# Patient Record
Sex: FEMALE | Race: WHITE | NOT HISPANIC OR LATINO | Employment: FULL TIME | URBAN - METROPOLITAN AREA
[De-identification: names, ages, dates, MRNs, and addresses within clinical notes are randomized per-mention and may not be internally consistent; named-entity substitution may affect disease eponyms.]

---

## 2017-02-20 ENCOUNTER — CONVERSION ENCOUNTER (OUTPATIENT)
Dept: GENERAL RADIOLOGY | Facility: HOSPITAL | Age: 52
End: 2017-02-20

## 2019-02-15 ENCOUNTER — HOSPITAL ENCOUNTER (OUTPATIENT)
Dept: MAMMOGRAPHY | Facility: HOSPITAL | Age: 54
Discharge: HOME | End: 2019-02-15
Attending: OBSTETRICS & GYNECOLOGY

## 2019-08-07 ENCOUNTER — HOSPITAL ENCOUNTER (OUTPATIENT)
Dept: GENERAL RADIOLOGY | Facility: HOSPITAL | Age: 54
Discharge: HOME | End: 2019-08-07
Attending: OBSTETRICS & GYNECOLOGY

## 2020-10-20 ENCOUNTER — OFFICE VISIT CONVERTED (OUTPATIENT)
Dept: FAMILY MEDICINE CLINIC | Age: 55
End: 2020-10-20
Attending: NURSE PRACTITIONER

## 2020-10-20 ENCOUNTER — HOSPITAL ENCOUNTER (OUTPATIENT)
Dept: OTHER | Facility: HOSPITAL | Age: 55
Discharge: HOME | End: 2020-10-20
Attending: NURSE PRACTITIONER

## 2020-10-20 LAB
25(OH)D3 SERPL-MCNC: 18 NG/ML (ref 30–100)
ALBUMIN SERPL-MCNC: 5 G/DL (ref 3.5–5)
ALBUMIN/GLOB SERPL: 2 {RATIO} (ref 1.4–2.6)
ALP SERPL-CCNC: 85 U/L (ref 53–141)
ALT SERPL-CCNC: 14 U/L (ref 10–40)
ANION GAP SERPL CALC-SCNC: 16 MMOL/L (ref 8–19)
AST SERPL-CCNC: 18 U/L (ref 15–50)
BASOPHILS # BLD AUTO: 0.05 10*3/UL (ref 0–0.2)
BASOPHILS NFR BLD AUTO: 0.9 % (ref 0–3)
BILIRUB SERPL-MCNC: 0.82 MG/DL (ref 0.2–1.3)
BUN SERPL-MCNC: 13 MG/DL (ref 5–25)
BUN/CREAT SERPL: 20 {RATIO} (ref 6–20)
CALCIUM SERPL-MCNC: 9.5 MG/DL (ref 8.7–10.4)
CHLORIDE SERPL-SCNC: 101 MMOL/L (ref 99–111)
CONV ABS IMM GRAN: 0.01 10*3/UL (ref 0–0.2)
CONV CO2: 26 MMOL/L (ref 22–32)
CONV IMMATURE GRAN: 0.2 % (ref 0–1.8)
CONV TOTAL PROTEIN: 7.5 G/DL (ref 6.3–8.2)
CREAT UR-MCNC: 0.66 MG/DL (ref 0.5–0.9)
DEPRECATED RDW RBC AUTO: 39.8 FL (ref 36.4–46.3)
EOSINOPHIL # BLD AUTO: 0.2 10*3/UL (ref 0–0.7)
EOSINOPHIL # BLD AUTO: 3.6 % (ref 0–7)
ERYTHROCYTE [DISTWIDTH] IN BLOOD BY AUTOMATED COUNT: 11.7 % (ref 11.7–14.4)
GFR SERPLBLD BASED ON 1.73 SQ M-ARVRAT: >60 ML/MIN/{1.73_M2}
GLOBULIN UR ELPH-MCNC: 2.5 G/DL (ref 2–3.5)
GLUCOSE SERPL-MCNC: 80 MG/DL (ref 65–99)
HCT VFR BLD AUTO: 41.3 % (ref 37–47)
HGB BLD-MCNC: 14 G/DL (ref 12–16)
LYMPHOCYTES # BLD AUTO: 1.65 10*3/UL (ref 1–5)
LYMPHOCYTES NFR BLD AUTO: 29.7 % (ref 20–45)
MCH RBC QN AUTO: 31.4 PG (ref 27–31)
MCHC RBC AUTO-ENTMCNC: 33.9 G/DL (ref 33–37)
MCV RBC AUTO: 92.6 FL (ref 81–99)
MONOCYTES # BLD AUTO: 0.46 10*3/UL (ref 0.2–1.2)
MONOCYTES NFR BLD AUTO: 8.3 % (ref 3–10)
NEUTROPHILS # BLD AUTO: 3.18 10*3/UL (ref 2–8)
NEUTROPHILS NFR BLD AUTO: 57.3 % (ref 30–85)
NRBC CBCN: 0 % (ref 0–0.7)
OSMOLALITY SERPL CALC.SUM OF ELEC: 287 MOSM/KG (ref 273–304)
PLATELET # BLD AUTO: 226 10*3/UL (ref 130–400)
PMV BLD AUTO: 10.5 FL (ref 9.4–12.3)
POTASSIUM SERPL-SCNC: 3.6 MMOL/L (ref 3.5–5.3)
RBC # BLD AUTO: 4.46 10*6/UL (ref 4.2–5.4)
SODIUM SERPL-SCNC: 139 MMOL/L (ref 135–147)
TSH SERPL-ACNC: 3.59 M[IU]/L (ref 0.27–4.2)
WBC # BLD AUTO: 5.55 10*3/UL (ref 4.8–10.8)

## 2020-10-21 LAB — VIT B12 SERPL-MCNC: 318 PG/ML (ref 211–911)

## 2021-01-23 ENCOUNTER — HOSPITAL ENCOUNTER (OUTPATIENT)
Dept: OTHER | Facility: HOSPITAL | Age: 56
Discharge: HOME | End: 2021-01-23
Attending: NURSE PRACTITIONER

## 2021-01-23 LAB — 25(OH)D3 SERPL-MCNC: 32.8 NG/ML (ref 30–100)

## 2021-04-08 ENCOUNTER — HOSPITAL ENCOUNTER (OUTPATIENT)
Dept: GENERAL RADIOLOGY | Facility: HOSPITAL | Age: 56
Discharge: HOME | End: 2021-04-08
Attending: OBSTETRICS & GYNECOLOGY

## 2021-05-05 ENCOUNTER — CONVERSION ENCOUNTER (OUTPATIENT)
Dept: GASTROENTEROLOGY | Facility: CLINIC | Age: 56
End: 2021-05-05
Attending: INTERNAL MEDICINE

## 2021-06-05 NOTE — H&P
History and Physical      Patient Name: Geraldine Candelario   Patient ID: [id number]   Sex: Female   YOB: 1965    Primary Care Provider: Virgilio Stovall MD   Referring Provider: Fredrick Hallman II MD    Visit Date: May 5, 2021    Provider: Xander Hallman MD   [facility]   Location Address: [address] [place] [address]  [id number]   Location Phone: [phone]          Chief Complaint  · Surgical History and Physical  · Screening Colonoscopy      History Of Present Illness  NON-INPATIENT HISTORY AND PHYSICAL  Allergies: NO KNOWN DRUG ALLERGIES   Chief Complaint/History of Present Illness: Colon Screening, His of colon Polyps   Colon Recall: Yes How Long: 3 years   Failed Outpatient Treatment/Contraindications: N/A   Current Medications: Suprep Bowel Prep Kit 17.5-3.13-1.6 gram oral recon soln   Significant Past Medical History: Rectal bleeding   Significant Family Medical History: Family history of colon cancer: Mother/58   Significant Past Surgical History: Colonoscopy and EGD (Endoscopy)   Previous Colonoscopy: Yes YEAR: 2017 By Whom: Xander Hallman MD   Previous EGD: Yes YEAR: 2010 By Whom: Xander Hallman MD   PHYSICAL EXAM:  Heart: Regular Rate and Rhythm   Lungs: Breathing Unlabored           Assessment  · Preoperative examination     V72.84/Z01.818  · Screening for colon cancer     V76.51/Z12.11  · History of colon polyps     V12.72/Z86.010  · Family history of colon cancer     V16.0/Z80.0      Plan  · Orders  o Consent for Colonoscopy with Possible Biopsy - Possible risks/complications, benefits, and alternatives to surgical or invasive procedure have been explained to patient and/or legal guardian. -Patient has been evaluated and can tolerate anesthesia and/or sedation. Risks, benefits, and alternatives to anesthesia and sedation have been explained to patient and/or legal guardian. (21299) - V72.84/Z01.818, V76.51/Z12.11, V12.72/Z86.010, V16.0/Z80.0 -  07/13/2021  · Medications  o Medications have been Reconciled  o Transition of Care or Provider Policy  · Instructions  o ****Surgical Orders****  o ***************  o Outpatient  o ***************  o RISK AND BENEFITS:  o Possible risks/complications, benefits and alternatives to surgical or invasive procedure have been explained to the patient and/or legal guardian.  o Patient has been evaluated and can tolerate anesthesia and/or sedation. Risks, benefits, and alternatives to anesthesia and sedation have been explained to the patient and/or legal guardian.  o ***************  o PREP: Per protocol  o IV: Per Anesthesia  o The above History and Physical Examination has been completed within 30 days of admission.  o This note has been transcribed by YEVGENIY Solorio. I have read and agree with the findings in this note.  o Electronically Identified Patient Education Materials Provided Electronically            Electronically Signed by: Prisca Maher MA -Author on May 5, 2021 09:43:58 AM

## 2021-07-02 VITALS
HEART RATE: 77 BPM | TEMPERATURE: 96.5 F | DIASTOLIC BLOOD PRESSURE: 61 MMHG | BODY MASS INDEX: 20.13 KG/M2 | WEIGHT: 132.8 LBS | HEIGHT: 68 IN | SYSTOLIC BLOOD PRESSURE: 108 MMHG

## 2021-10-05 RX ORDER — FLUTICASONE PROPIONATE 50 MCG
2 SPRAY, SUSPENSION (ML) NASAL DAILY
COMMUNITY

## 2021-10-05 RX ORDER — CETIRIZINE HYDROCHLORIDE 10 MG/1
10 TABLET ORAL DAILY
COMMUNITY

## 2021-10-05 NOTE — PRE-PROCEDURE INSTRUCTIONS
Pt states she has had several colonoscopies due to mother had colon cancer. Verbalized understanding of clear liq diet and po prep, note last bm should be clear. Arrival time is 0700 she can take flonase, and zyrtec with a sip of water in the am

## 2021-10-11 ENCOUNTER — HOSPITAL ENCOUNTER (OUTPATIENT)
Facility: HOSPITAL | Age: 56
Setting detail: HOSPITAL OUTPATIENT SURGERY
Discharge: HOME | End: 2021-10-11
Attending: INTERNAL MEDICINE | Admitting: INTERNAL MEDICINE

## 2021-10-11 ENCOUNTER — ANESTHESIA (OUTPATIENT)
Dept: GASTROENTEROLOGY | Facility: HOSPITAL | Age: 56
End: 2021-10-11

## 2021-10-11 ENCOUNTER — ANESTHESIA EVENT (OUTPATIENT)
Dept: GASTROENTEROLOGY | Facility: HOSPITAL | Age: 56
End: 2021-10-11

## 2021-10-11 VITALS
SYSTOLIC BLOOD PRESSURE: 105 MMHG | HEART RATE: 62 BPM | DIASTOLIC BLOOD PRESSURE: 73 MMHG | TEMPERATURE: 97 F | HEIGHT: 67 IN | RESPIRATION RATE: 16 BRPM | BODY MASS INDEX: 20.42 KG/M2 | OXYGEN SATURATION: 100 % | WEIGHT: 130.07 LBS

## 2021-10-11 DIAGNOSIS — Z86.010 HISTORY OF COLON POLYPS: ICD-10-CM

## 2021-10-11 PROCEDURE — 45385 COLONOSCOPY W/LESION REMOVAL: CPT | Performed by: INTERNAL MEDICINE

## 2021-10-11 PROCEDURE — 88305 TISSUE EXAM BY PATHOLOGIST: CPT | Performed by: INTERNAL MEDICINE

## 2021-10-11 PROCEDURE — C1889 IMPLANT/INSERT DEVICE, NOC: HCPCS | Performed by: INTERNAL MEDICINE

## 2021-10-11 PROCEDURE — 25010000002 PROPOFOL 10 MG/ML EMULSION: Performed by: NURSE ANESTHETIST, CERTIFIED REGISTERED

## 2021-10-11 PROCEDURE — 45381 COLONOSCOPY SUBMUCOUS NJX: CPT | Performed by: INTERNAL MEDICINE

## 2021-10-11 PROCEDURE — 25010000002 GLUCAGON (HUMAN RECOMBINANT) 1 MG RECONSTITUTED SOLUTION: Performed by: NURSE ANESTHETIST, CERTIFIED REGISTERED

## 2021-10-11 DEVICE — DEV CLIP HEMO RESOLUTION360/ULTR 235CM 17MM STRL: Type: IMPLANTABLE DEVICE | Site: COLON | Status: FUNCTIONAL

## 2021-10-11 DEVICE — DEV CLIP ENDO RESOLUTION360 CONTRL ROT 235CM: Type: IMPLANTABLE DEVICE | Site: COLON | Status: FUNCTIONAL

## 2021-10-11 RX ORDER — LIDOCAINE HYDROCHLORIDE 20 MG/ML
INJECTION, SOLUTION INFILTRATION; PERINEURAL AS NEEDED
Status: DISCONTINUED | OUTPATIENT
Start: 2021-10-11 | End: 2021-10-11 | Stop reason: SURG

## 2021-10-11 RX ORDER — EPHEDRINE SULFATE 50 MG/ML
INJECTION, SOLUTION INTRAVENOUS AS NEEDED
Status: DISCONTINUED | OUTPATIENT
Start: 2021-10-11 | End: 2021-10-11 | Stop reason: SURG

## 2021-10-11 RX ORDER — SODIUM CHLORIDE, SODIUM LACTATE, POTASSIUM CHLORIDE, CALCIUM CHLORIDE 600; 310; 30; 20 MG/100ML; MG/100ML; MG/100ML; MG/100ML
30 INJECTION, SOLUTION INTRAVENOUS CONTINUOUS
Status: DISCONTINUED | OUTPATIENT
Start: 2021-10-11 | End: 2021-10-11 | Stop reason: HOSPADM

## 2021-10-11 RX ORDER — PHENYLEPHRINE HCL IN 0.9% NACL 1 MG/10 ML
SYRINGE (ML) INTRAVENOUS AS NEEDED
Status: DISCONTINUED | OUTPATIENT
Start: 2021-10-11 | End: 2021-10-11 | Stop reason: SURG

## 2021-10-11 RX ADMIN — Medication 50 MCG: at 09:30

## 2021-10-11 RX ADMIN — SODIUM CHLORIDE, POTASSIUM CHLORIDE, SODIUM LACTATE AND CALCIUM CHLORIDE 30 ML/HR: 600; 310; 30; 20 INJECTION, SOLUTION INTRAVENOUS at 07:53

## 2021-10-11 RX ADMIN — Medication 100 MCG: at 09:21

## 2021-10-11 RX ADMIN — Medication 50 MCG: at 09:36

## 2021-10-11 RX ADMIN — GLUCAGON HYDROCHLORIDE 0.5 MG: KIT at 09:10

## 2021-10-11 RX ADMIN — GLUCAGON HYDROCHLORIDE 0.5 MG: KIT at 09:14

## 2021-10-11 RX ADMIN — EPHEDRINE SULFATE 5 MG: 50 INJECTION INTRAVENOUS at 09:36

## 2021-10-11 RX ADMIN — PROPOFOL 250 MCG/KG/MIN: 10 INJECTION, EMULSION INTRAVENOUS at 08:45

## 2021-10-11 RX ADMIN — LIDOCAINE HYDROCHLORIDE 40 MG: 20 INJECTION, SOLUTION INFILTRATION; PERINEURAL at 08:45

## 2021-10-11 NOTE — ANESTHESIA PREPROCEDURE EVALUATION
Anesthesia Evaluation     Patient summary reviewed and Nursing notes reviewed   no history of anesthetic complications:  NPO Solid Status: > 8 hours  NPO Liquid Status: > 2 hours           Airway   Mallampati: II  TM distance: >3 FB  Neck ROM: full  No difficulty expected  Dental      Pulmonary - negative pulmonary ROS and normal exam    breath sounds clear to auscultation  Cardiovascular - negative cardio ROS and normal exam  Exercise tolerance: excellent (>7 METS)    Rhythm: regular        Neuro/Psych- negative ROS  GI/Hepatic/Renal/Endo    (+)  GERD, GI bleeding lower ,     Musculoskeletal     Abdominal    Substance History      OB/GYN          Other                        Anesthesia Plan    ASA 2     general   total IV anesthesia    Anesthetic plan, all risks, benefits, and alternatives have been provided, discussed and informed consent has been obtained with: patient.

## 2021-10-11 NOTE — ANESTHESIA POSTPROCEDURE EVALUATION
Patient: Geraldine Candelario    Procedure Summary     Date: 10/11/21 Room / Location: [facility] ENDOSCOPY 3 / [facility] ENDOSCOPY    Anesthesia Start: 0844 Anesthesia Stop: 0948    Procedure: COLONOSCOPY WITH ORISE INJECTION, POLYPECTOMY, CAUTERY, CLIP APPLICATION X6 (N/A ) Diagnosis: (history of colon polyps)    Surgeons: Xander Hallman MD Provider: Ryna Martinez MD    Anesthesia Type: general ASA Status: 2          Anesthesia Type: general    Vitals  Vitals Value Taken Time   BP     Temp     Pulse 59 10/11/21 1000   Resp     SpO2 100 % 10/11/21 1000   Vitals shown include unvalidated device data.        Post Anesthesia Care and Evaluation    Patient location during evaluation: bedside  Patient participation: complete - patient participated  Level of consciousness: awake  Pain management: adequate  Airway patency: patent  Anesthetic complications: No anesthetic complications  PONV Status: none  Cardiovascular status: acceptable and stable  Respiratory status: acceptable  Hydration status: acceptable    Comments: An Anesthesiologist personally participated in the most demanding procedures (including induction and emergence if applicable) in the anesthesia plan, monitored the course of anesthesia administration at frequent intervals and remained physically present and available for immediate diagnosis and treatment of emergencies.

## 2021-10-11 NOTE — H&P
"ScreeningPre Procedure History & Physical    Chief Complaint:   Screening     Subjective     HPI:   Screening     Past Medical History:   Past Medical History:   Diagnosis Date   • GERD (gastroesophageal reflux disease)    • Rectal bleeding        Past Surgical History:  Past Surgical History:   Procedure Laterality Date   • ABDOMINAL SURGERY     • COLONOSCOPY      2016 & 2017   • ENDOSCOPY      2010 & 2005   • TONSILLECTOMY     • TOOTH EXTRACTION     • TUBAL ABDOMINAL LIGATION         Family History:  Family History   Problem Relation Age of Onset   • Colon cancer Mother 58   • Cancer Mother        Social History:   reports that she has never smoked. She does not have any smokeless tobacco history on file. She reports current alcohol use. Drug use questions deferred to the physician.    Medications:   Medications Prior to Admission   Medication Sig Dispense Refill Last Dose   • cetirizine (ZyrTEC Allergy) 10 MG tablet Take 10 mg by mouth Daily.   10/10/2021 at Unknown time   • fluticasone (FLONASE) 50 MCG/ACT nasal spray 2 sprays into the nostril(s) as directed by provider Daily.   10/10/2021 at Unknown time       Allergies:  Latex        Objective     Blood pressure 106/69, pulse 73, temperature 98.5 °F (36.9 °C), temperature source Temporal, resp. rate 17, height 170.2 cm (67.01\"), weight 59 kg (130 lb 1.1 oz), SpO2 97 %.    Physical Exam   Constitutional: Pt is oriented to person, place, and time and well-developed, well-nourished, and in no distress.   Mouth/Throat: Oropharynx is clear and moist.   Neck: Normal range of motion.   Cardiovascular: Normal rate, regular rhythm and normal heart sounds.    Pulmonary/Chest: Effort normal and breath sounds normal.   Abdominal: Soft. Nontender  Skin: Skin is warm and dry.   Psychiatric: Mood, memory, affect and judgment normal.     Assessment/Plan     Diagnosis:  Screening colonoscopy  H/o colon polyps     Anticipated Surgical Procedure:  colonoscopy    The risks, " benefits, and alternatives of this procedure have been discussed with the patient or the responsible party- the patient understands and agrees to proceed.

## 2021-10-12 LAB
CYTO UR: NORMAL
LAB AP CASE REPORT: NORMAL
LAB AP CLINICAL INFORMATION: NORMAL
PATH REPORT.FINAL DX SPEC: NORMAL
PATH REPORT.GROSS SPEC: NORMAL

## 2021-10-15 ENCOUNTER — PREP FOR SURGERY (OUTPATIENT)
Dept: OTHER | Facility: HOSPITAL | Age: 56
End: 2021-10-15

## 2021-10-15 DIAGNOSIS — K63.5 POLYP OF COLON, UNSPECIFIED PART OF COLON, UNSPECIFIED TYPE: Primary | ICD-10-CM

## 2021-10-18 PROBLEM — K63.5 POLYP OF COLON: Status: ACTIVE | Noted: 2021-10-18

## 2022-01-03 ENCOUNTER — TELEPHONE (OUTPATIENT)
Dept: GASTROENTEROLOGY | Facility: CLINIC | Age: 57
End: 2022-01-03

## 2022-01-03 NOTE — TELEPHONE ENCOUNTER
"Ms Candelario called stating on 02/07/22 she is sched for a colonoscopy. Starting yesterday afternoon, having dysphagia and food lodge in her throat. She said its better now and about 10 yrs ago she had her \"throat stretched\" in [place]. Asking to add a EGD 02/07/2022..    Ok per NEHEMIAS Molina  "

## 2022-02-03 NOTE — PRE-PROCEDURE INSTRUCTIONS
PT INSTRUCTED WHERE TO COME TO AND CLEAR LIQ DIET AND PO SPLIT PREP SHE CAN TAKE  ZYRTEC  WITH A SIP OF WATER IN THE AM  ARRIVAL TIME IS 0600 AM ON Monday 2/7/22

## 2022-02-07 ENCOUNTER — ANESTHESIA EVENT (OUTPATIENT)
Dept: GASTROENTEROLOGY | Facility: HOSPITAL | Age: 57
End: 2022-02-07

## 2022-02-07 ENCOUNTER — HOSPITAL ENCOUNTER (OUTPATIENT)
Facility: HOSPITAL | Age: 57
Setting detail: HOSPITAL OUTPATIENT SURGERY
Discharge: HOME | End: 2022-02-07
Attending: INTERNAL MEDICINE | Admitting: INTERNAL MEDICINE

## 2022-02-07 ENCOUNTER — ANESTHESIA (OUTPATIENT)
Dept: GASTROENTEROLOGY | Facility: HOSPITAL | Age: 57
End: 2022-02-07

## 2022-02-07 VITALS
WEIGHT: 132.28 LBS | OXYGEN SATURATION: 99 % | HEART RATE: 61 BPM | SYSTOLIC BLOOD PRESSURE: 105 MMHG | DIASTOLIC BLOOD PRESSURE: 70 MMHG | TEMPERATURE: 96.8 F | BODY MASS INDEX: 20.76 KG/M2 | HEIGHT: 67 IN | RESPIRATION RATE: 16 BRPM

## 2022-02-07 DIAGNOSIS — D12.6 ADENOMATOUS POLYP OF COLON, UNSPECIFIED PART OF COLON: Primary | ICD-10-CM

## 2022-02-07 DIAGNOSIS — K63.5 POLYP OF COLON, UNSPECIFIED PART OF COLON, UNSPECIFIED TYPE: ICD-10-CM

## 2022-02-07 PROCEDURE — 43239 EGD BIOPSY SINGLE/MULTIPLE: CPT | Performed by: INTERNAL MEDICINE

## 2022-02-07 PROCEDURE — 43450 DILATE ESOPHAGUS 1/MULT PASS: CPT | Performed by: INTERNAL MEDICINE

## 2022-02-07 PROCEDURE — 88305 TISSUE EXAM BY PATHOLOGIST: CPT | Performed by: INTERNAL MEDICINE

## 2022-02-07 PROCEDURE — 25010000002 PROPOFOL 10 MG/ML EMULSION: Performed by: NURSE ANESTHETIST, CERTIFIED REGISTERED

## 2022-02-07 PROCEDURE — 45378 DIAGNOSTIC COLONOSCOPY: CPT | Performed by: INTERNAL MEDICINE

## 2022-02-07 RX ORDER — PROPOFOL 10 MG/ML
VIAL (ML) INTRAVENOUS AS NEEDED
Status: DISCONTINUED | OUTPATIENT
Start: 2022-02-07 | End: 2022-02-07 | Stop reason: SURG

## 2022-02-07 RX ORDER — GLYCOPYRROLATE 0.2 MG/ML
INJECTION INTRAMUSCULAR; INTRAVENOUS AS NEEDED
Status: DISCONTINUED | OUTPATIENT
Start: 2022-02-07 | End: 2022-02-07 | Stop reason: SURG

## 2022-02-07 RX ORDER — SODIUM CHLORIDE, SODIUM LACTATE, POTASSIUM CHLORIDE, CALCIUM CHLORIDE 600; 310; 30; 20 MG/100ML; MG/100ML; MG/100ML; MG/100ML
1000 INJECTION, SOLUTION INTRAVENOUS CONTINUOUS
Status: DISCONTINUED | OUTPATIENT
Start: 2022-02-07 | End: 2022-02-07 | Stop reason: HOSPADM

## 2022-02-07 RX ORDER — SODIUM CHLORIDE, SODIUM LACTATE, POTASSIUM CHLORIDE, CALCIUM CHLORIDE 600; 310; 30; 20 MG/100ML; MG/100ML; MG/100ML; MG/100ML
30 INJECTION, SOLUTION INTRAVENOUS CONTINUOUS
Status: DISCONTINUED | OUTPATIENT
Start: 2022-02-07 | End: 2022-02-07 | Stop reason: HOSPADM

## 2022-02-07 RX ORDER — LIDOCAINE HYDROCHLORIDE 20 MG/ML
INJECTION, SOLUTION INFILTRATION; PERINEURAL AS NEEDED
Status: DISCONTINUED | OUTPATIENT
Start: 2022-02-07 | End: 2022-02-07 | Stop reason: SURG

## 2022-02-07 RX ADMIN — PROPOFOL 70 MG: 10 INJECTION, EMULSION INTRAVENOUS at 07:39

## 2022-02-07 RX ADMIN — PROPOFOL 150 MCG/KG/MIN: 10 INJECTION, EMULSION INTRAVENOUS at 07:37

## 2022-02-07 RX ADMIN — GLYCOPYRROLATE 0.1 MG: 0.2 INJECTION INTRAMUSCULAR; INTRAVENOUS at 07:40

## 2022-02-07 RX ADMIN — LIDOCAINE HYDROCHLORIDE 60 MG: 20 INJECTION, SOLUTION INFILTRATION; PERINEURAL at 07:37

## 2022-02-07 RX ADMIN — PROPOFOL 30 MG: 10 INJECTION, EMULSION INTRAVENOUS at 07:49

## 2022-02-07 RX ADMIN — SODIUM CHLORIDE, POTASSIUM CHLORIDE, SODIUM LACTATE AND CALCIUM CHLORIDE 30 ML/HR: 600; 310; 30; 20 INJECTION, SOLUTION INTRAVENOUS at 06:46

## 2022-02-07 RX ADMIN — PROPOFOL 30 MG: 10 INJECTION, EMULSION INTRAVENOUS at 07:44

## 2022-02-07 NOTE — ANESTHESIA POSTPROCEDURE EVALUATION
Patient: Geraldine Candelario    Procedure Summary     Date: 02/07/22 Room / Location: [facility] ENDOSCOPY 2 / [facility] ENDOSCOPY    Anesthesia Start: 0736 Anesthesia Stop: 0805    Procedures:       ESOPHAGOGASTRODUODENOSCOPY WITH BIOPSIES, DILATION WITH 50FR CALL (N/A )      COLONOSCOPY (N/A ) Diagnosis:       Polyp of colon, unspecified part of colon, unspecified type      (Polyp of colon, unspecified part of colon, unspecified type [K63.5])      (DYSPHAGIA)    Surgeons: Xander Hallman MD Provider: Graham Condon MD    Anesthesia Type: general ASA Status: 1          Anesthesia Type: general    Vitals  Vitals Value Taken Time   /70 02/07/22 0825   Temp 36 °C (96.8 °F) 02/07/22 0825   Pulse 61 02/07/22 0825   Resp 16 02/07/22 0825   SpO2 99 % 02/07/22 0825           Post Anesthesia Care and Evaluation    Patient location during evaluation: bedside  Patient participation: complete - patient participated  Level of consciousness: awake  Pain management: adequate  Airway patency: patent  Anesthetic complications: No anesthetic complications  PONV Status: none  Cardiovascular status: acceptable  Respiratory status: acceptable  Hydration status: acceptable    Comments: An Anesthesiologist personally participated in the most demanding procedures (including induction and emergence if applicable) in the anesthesia plan, monitored the course of anesthesia administration at frequent intervals and remained physically present and available for immediate diagnosis and treatment of emergencies.

## 2022-02-07 NOTE — H&P
"ScreeningPre Procedure History & Physical    Chief Complaint:   Screening   dysphagia    Subjective     HPI:   Screening     Past Medical History:   Past Medical History:   Diagnosis Date   • Colon polyps    • Esophageal dilatation 2012   • Esophageal stricture 2012   • GERD (gastroesophageal reflux disease)    • Rectal bleeding        Past Surgical History:  Past Surgical History:   Procedure Laterality Date   • ABDOMINAL SURGERY     • COLONOSCOPY      2016 & 2017   • COLONOSCOPY N/A 10/11/2021    Procedure: COLONOSCOPY WITH ORISE INJECTION, POLYPECTOMY, CAUTERY, CLIP APPLICATION X6;  Surgeon: Xander Hallman MD;  Location: [facility] ENDOSCOPY;  Service: Gastroenterology;  Laterality: N/A;  DIVERTICULOSIS, COLON POLYP   • ENDOSCOPY      2010 & 2005   • TONSILLECTOMY     • TOOTH EXTRACTION     • TUBAL ABDOMINAL LIGATION         Family History:  Family History   Problem Relation Age of Onset   • Colon cancer Mother 58   • Cancer Mother    • Cancer Sister    • Colon cancer Sister 64   • Malig Hyperthermia Neg Hx        Social History:   reports that she has never smoked. She has never used smokeless tobacco. She reports current alcohol use. Drug use questions deferred to the physician.    Medications:   Medications Prior to Admission   Medication Sig Dispense Refill Last Dose   • cetirizine (ZyrTEC Allergy) 10 MG tablet Take 10 mg by mouth Daily.   Past Week at Unknown time   • fluticasone (FLONASE) 50 MCG/ACT nasal spray 2 sprays into the nostril(s) as directed by provider Daily.   Past Week at Unknown time       Allergies:  Latex        Objective     Blood pressure 120/67, pulse 66, temperature 97.3 °F (36.3 °C), temperature source Temporal, resp. rate 16, height 170.2 cm (67.01\"), weight 60 kg (132 lb 4.4 oz), SpO2 97 %.    Physical Exam   Constitutional: Pt is oriented to person, place, and time and well-developed, well-nourished, and in no distress.   Mouth/Throat: Oropharynx is clear and moist.   Neck: " Normal range of motion.   Cardiovascular: Normal rate, regular rhythm and normal heart sounds.    Pulmonary/Chest: Effort normal and breath sounds normal.   Abdominal: Soft. Nontender  Skin: Skin is warm and dry.   Psychiatric: Mood, memory, affect and judgment normal.     Assessment/Plan     Diagnosis:  Screening colonoscopy  dysphagia     Anticipated Surgical Procedure:  colonoscopy  egd    The risks, benefits, and alternatives of this procedure have been discussed with the patient or the responsible party- the patient understands and agrees to proceed.

## 2022-02-08 ENCOUNTER — TELEPHONE (OUTPATIENT)
Dept: GASTROENTEROLOGY | Facility: CLINIC | Age: 57
End: 2022-02-08

## 2022-02-08 NOTE — TELEPHONE ENCOUNTER
----- Message from Rima Waggoner NP sent at 2/8/2022  9:13 AM EST -----  Eosinophilic esophagitis.  Please inform the patient and educate about EOE.    Recall for colonoscopy in 3 years.

## 2022-02-14 NOTE — TELEPHONE ENCOUNTER
Spoke to pt and informed of Rima BIANCHI note. Educated pt on EOE and tracking with a food diary. Confirmed with pt f/u apt that has been est with Rima BIANCHI on 05/09/2022.  Pt verbalized understanding.

## 2022-05-04 NOTE — PROGRESS NOTES
Chief Complaint   EGD and colonoscopy follow up     History of Present Illness       Geraldine Candelario is a 56 y.o. female who presents to [facility] GASTROENTEROLOGY for follow-up after recent EGD and colonoscopy.  We have reviewed endoscopy and pathology at this time.  Patient has a history of dysphagia, eosinophilic esophagitis, colon polyps, family history of colon cancer and allergies.  It was noted on EGD that the patient had EOE.  Patient has not began the Prilosec.  She reports after esophageal dilation that she had complete relief of her dysphagia.  Patient reports she did have allergy testing and she continues on Zyrtec and Flonase for seasonal allergies.  Patient denies fever, nausea, vomiting, weight loss, night sweats, melena, hematochezia, hematemesis.    Endoscopy: Review of the patient's most recent EGD and colonoscopy performed by Dr. Hallman on 02/07/2022 mucosal changes including ringed esophagus and longitudinal furrows were found in the entire esophagus.  Eosinophilic esophagitis suspected.  1 benign-appearing intrinsic stenosis at the GE junction dilated with Madrigal 50 [place].  Mild erythema of the stomach.  Duodenum normal.  Medium size hiatal hernia.  Patient placed on Prilosec for 3 months.  Normal colonoscopy.  Repeat colonoscopy 3 years, 2025.  Pathology with mild inactive gastritis.  Midesophagus biopsy consistent with eosinophilic esophagitis.    Results       Result Review :                             Past Medical History       Past Medical History:   Diagnosis Date   • Colon polyps    • Esophageal dilatation 2012   • Esophageal stricture 2012   • GERD (gastroesophageal reflux disease)    • Rectal bleeding        Past Surgical History:   Procedure Laterality Date   • ABDOMINAL SURGERY     • COLONOSCOPY      2016 & 2017   • COLONOSCOPY N/A 10/11/2021    Procedure: COLONOSCOPY WITH ORISE INJECTION, POLYPECTOMY, CAUTERY, CLIP APPLICATION X6;  Surgeon: Xander Hallman  "MD Warren;  Location: [facility] ENDOSCOPY;  Service: Gastroenterology;  Laterality: N/A;  DIVERTICULOSIS, COLON POLYP   • COLONOSCOPY N/A 02/07/2022    Procedure: COLONOSCOPY;  Surgeon: Xander Hallman MD;  Location: [facility] ENDOSCOPY;  Service: Gastroenterology;  Laterality: N/A;  NORMAL COLONOSCOPY   • ENDOSCOPY      2010 & 2005   • ENDOSCOPY N/A 02/07/2022    Procedure: ESOPHAGOGASTRODUODENOSCOPY WITH BIOPSIES, DILATION WITH 50FR CALL;  Surgeon: Xander Hallman MD;  Location: [facility] ENDOSCOPY;  Service: Gastroenterology;  Laterality: N/A;  EOSINOPHYLLIC ESOPHAGITIS, ESOPHAGEAL STRICTURE, GASTRITIS   • TONSILLECTOMY     • TOOTH EXTRACTION     • TUBAL ABDOMINAL LIGATION     • UPPER GASTROINTESTINAL ENDOSCOPY           Current Outpatient Medications:   •  cetirizine (zyrTEC) 10 MG tablet, Take 10 mg by mouth Daily., Disp: , Rfl:   •  fluticasone (FLONASE) 50 MCG/ACT nasal spray, 2 sprays into the nostril(s) as directed by provider Daily., Disp: , Rfl:   •  omeprazole (priLOSEC) 40 MG capsule, Take 1 capsule by mouth Daily., Disp: 30 capsule, Rfl: 5     Allergies   Allergen Reactions   • Latex Unknown - Low Severity     ALLERGY TESTING ONLY NEVER HAS HAD A REACTION       Family History   Problem Relation Age of Onset   • Colon cancer Mother 58   • Cancer Mother    • Cancer Sister    • Colon cancer Sister 64   • Malig Hyperthermia Neg Hx         Social History     Social History Narrative   • Not on file       Objective     Vital Signs:   /60 (BP Location: Right arm, Patient Position: Sitting, Cuff Size: Adult)   Pulse 70   Ht 170.2 cm (67.01\")   Wt 62.3 kg (137 lb 6.4 oz)   SpO2 100%   BMI 21.51 kg/m²       Physical Exam  Constitutional:       General: She is not in acute distress.     Appearance: Normal appearance. She is well-developed and normal weight.   Eyes:      Conjunctiva/sclera: Conjunctivae normal.      Pupils: Pupils are equal, round, and reactive to light.      Visual Fields: " Right eye visual fields normal and left eye visual fields normal.   Cardiovascular:      Rate and Rhythm: Normal rate and regular rhythm.      Heart sounds: Normal heart sounds.   Pulmonary:      Effort: Pulmonary effort is normal. No retractions.      Breath sounds: Normal breath sounds and air entry.      Comments: Inspection of chest: normal appearance  Abdominal:      General: Bowel sounds are normal.      Palpations: Abdomen is soft.      Tenderness: There is no abdominal tenderness.      Comments: No appreciable hepatosplenomegaly   Musculoskeletal:      Cervical back: Neck supple.      Right lower leg: No edema.      Left lower leg: No edema.   Lymphadenopathy:      Cervical: No cervical adenopathy.   Skin:     Findings: No lesion.      Comments: Turgor normal   Neurological:      Mental Status: She is alert and oriented to person, place, and time.   Psychiatric:         Mood and Affect: Mood and affect normal.           Assessment & Plan          Assessment and Plan    Diagnoses and all orders for this visit:    1. Eosinophilic esophagitis (Primary)    2. Personal history of colonic polyps    3. Oropharyngeal dysphagia    Other orders  -     omeprazole (priLOSEC) 40 MG capsule; Take 1 capsule by mouth Daily.  Dispense: 30 capsule; Refill: 5      56-year-old female for follow-up after recent EGD and colonoscopy.  We have reviewed endoscopy and pathology at this time.  Patient has a history of dysphagia, eosinophilic esophagitis, colon polyps, family history of colon cancer and allergies.  I have began the patient on Prilosec 40 mg daily for history of EOE.  We will follow-up in the office in 1 year.  Patient to call if dysphagia reoccurs.  Patient be due for repeat colonoscopy in 2025.  Patient agreeable to this plan will call with any questions or concerns.          Follow Up       Follow Up   Return in about 1 year (around 5/9/2023).  Patient was given instructions and counseling regarding her condition or  for health maintenance advice. Please see specific information pulled into the AVS if appropriate.

## 2022-05-09 ENCOUNTER — OFFICE VISIT (OUTPATIENT)
Dept: GASTROENTEROLOGY | Facility: CLINIC | Age: 57
End: 2022-05-09

## 2022-05-09 VITALS
HEIGHT: 67 IN | OXYGEN SATURATION: 100 % | SYSTOLIC BLOOD PRESSURE: 101 MMHG | DIASTOLIC BLOOD PRESSURE: 60 MMHG | WEIGHT: 137.4 LBS | HEART RATE: 70 BPM | BODY MASS INDEX: 21.56 KG/M2

## 2022-05-09 DIAGNOSIS — Z86.010 PERSONAL HISTORY OF COLONIC POLYPS: ICD-10-CM

## 2022-05-09 DIAGNOSIS — K20.0 EOSINOPHILIC ESOPHAGITIS: Primary | ICD-10-CM

## 2022-05-09 DIAGNOSIS — R13.12 OROPHARYNGEAL DYSPHAGIA: ICD-10-CM

## 2022-05-09 PROCEDURE — 99214 OFFICE O/P EST MOD 30 MIN: CPT | Performed by: NURSE PRACTITIONER

## 2022-05-09 RX ORDER — OMEPRAZOLE 40 MG/1
40 CAPSULE, DELAYED RELEASE ORAL DAILY
Qty: 30 CAPSULE | Refills: 5 | Status: SHIPPED | OUTPATIENT
Start: 2022-05-09

## 2022-05-09 NOTE — PATIENT INSTRUCTIONS
Food Choices for Gastroesophageal Reflux Disease, Adult  When you have gastroesophageal reflux disease (GERD), the foods you eat and your eating habits are very important. Choosing the right foods can help ease your discomfort. Think about working with a food expert (dietitian) to help you make good choices.  What are tips for following this plan?  Reading food labels  Look for foods that are low in saturated fat. Foods that may help with your symptoms include:  Foods that have less than 5% of daily value (DV) of fat.  Foods that have 0 grams of trans fat.  Cooking  Do not avila your food.  Cook your food by baking, steaming, grilling, or broiling. These are all methods that do not need a lot of fat for cooking.  To add flavor, try to use herbs that are low in spice and acidity.  Meal planning    Choose healthy foods that are low in fat, such as:  Fruits and vegetables.  Whole grains.  Low-fat dairy products.  Lean meats, fish, and poultry.  Eat small meals often instead of eating 3 large meals each day. Eat your meals slowly in a place where you are relaxed. Avoid bending over or lying down until 2-3 hours after eating.  Limit high-fat foods such as fatty meats or fried foods.  Limit your intake of fatty foods, such as oils, butter, and shortening.  Avoid the following as told by your doctor:  Foods that cause symptoms. These may be different for different people. Keep a food diary to keep track of foods that cause symptoms.  Alcohol.  Drinking a lot of liquid with meals.  Eating meals during the 2-3 hours before bed.    Lifestyle  Stay at a healthy weight. Ask your doctor what weight is healthy for you. If you need to lose weight, work with your doctor to do so safely.  Exercise for at least 30 minutes on 5 or more days each week, or as told by your doctor.  Wear loose-fitting clothes.  Do not smoke or use any products that contain nicotine or tobacco. If you need help quitting, ask your doctor.  Sleep with the head  of your bed higher than your feet. Use a wedge under the mattress or blocks under the bed frame to raise the head of the bed.  Chew sugar-free gum after meals.  What foods should eat?    Eat a healthy, well-balanced diet of fruits, vegetables, whole grains, low-fat dairy products, lean meats, fish, and poultry. Each person is different.  Foods that may cause symptoms in one person may not cause any symptoms in another person. Work with your doctor to find foods that are safe for you.  The items listed above may not be a complete list of what you can eat and drink. Contact a food expert for more options.  What foods should I avoid?  Limiting some of these foods may help in managing the symptoms of GERD. Everyone is different. Talk with a food expert or your doctor to help you find the exact foods to avoid, if any.  Fruits  Any fruits prepared with added fat. Any fruits that cause symptoms. For some people, this may include citrus fruits, such as oranges, grapefruit, pineapple, and lesli.  Vegetables  Deep-fried vegetables. French fries. Any vegetables prepared with added fat. Any vegetables that cause symptoms. For some people, this may include tomatoes and tomato products, chili peppers, onions and garlic, and horseradish.  Grains  Pastries or quick breads with added fat.  Meats and other proteins  High-fat meats, such as fatty beef or pork, hot dogs, ribs, ham, sausage, salami, and martinez. Fried meat or protein, including fried fish and fried chicken. Nuts and nut butters, in large amounts.  Dairy  Whole milk and chocolate milk. Sour cream. Cream. Ice cream. Cream cheese. Milkshakes.  Fats and oils  Butter. Margarine. Shortening. Ghee.  Beverages  Coffee and tea, with or without caffeine. Carbonated beverages. Sodas. Energy drinks. Fruit juice made with acidic fruits, such as orange or grapefruit. Tomato juice. Alcoholic drinks.  Sweets and desserts  Chocolate and cocoa. Donuts.  Seasonings and condiments  Pepper.  Peppermint and spearmint. Added salt. Any condiments, herbs, or seasonings that cause symptoms. For some people, this may include jernigan, hot sauce, or vinegar-based salad dressings.  The items listed above may not be a complete list of what you should not eat and drink. Contact a food expert for more options.  Questions to ask your doctor  Diet and lifestyle changes are often the first steps that are taken to manage symptoms of GERD. If diet and lifestyle changes do not help, talk with your doctor about taking medicines.  Where to find more information  [employer] Gastrointestinal [employer]: aboutgerd.org  Summary  When you have GERD, food and lifestyle choices are very important in easing your symptoms.  Eat small meals often instead of 3 large meals a day. Eat your meals slowly and in a place where you are relaxed.  Avoid bending over or lying down until 2-3 hours after eating.  Limit high-fat foods such as fatty meats or fried foods.  This information is not intended to replace advice given to you by your health care provider. Make sure you discuss any questions you have with your health care provider.  Document Revised: 06/28/2021 Document Reviewed: 06/28/2021  Elsevier Patient Education © 2021 Elsevier Inc.

## 2022-06-09 ENCOUNTER — OFFICE VISIT (OUTPATIENT)
Dept: FAMILY MEDICINE CLINIC | Age: 57
End: 2022-06-09

## 2022-06-09 VITALS
HEIGHT: 67 IN | HEART RATE: 72 BPM | SYSTOLIC BLOOD PRESSURE: 98 MMHG | OXYGEN SATURATION: 99 % | WEIGHT: 136 LBS | DIASTOLIC BLOOD PRESSURE: 61 MMHG | BODY MASS INDEX: 21.35 KG/M2

## 2022-06-09 DIAGNOSIS — Z87.898 H/O MOTION SICKNESS: ICD-10-CM

## 2022-06-09 DIAGNOSIS — Z00.00 WELL ADULT EXAM: ICD-10-CM

## 2022-06-09 DIAGNOSIS — M81.0 OSTEOPOROSIS WITHOUT CURRENT PATHOLOGICAL FRACTURE, UNSPECIFIED OSTEOPOROSIS TYPE: Primary | ICD-10-CM

## 2022-06-09 DIAGNOSIS — G47.00 INSOMNIA, UNSPECIFIED TYPE: ICD-10-CM

## 2022-06-09 PROCEDURE — 99396 PREV VISIT EST AGE 40-64: CPT | Performed by: NURSE PRACTITIONER

## 2022-06-09 RX ORDER — HYDROXYZINE HYDROCHLORIDE 25 MG/1
25 TABLET, FILM COATED ORAL 3 TIMES DAILY PRN
Qty: 90 TABLET | Refills: 1 | Status: SHIPPED | OUTPATIENT
Start: 2022-06-09

## 2022-06-09 RX ORDER — PROMETHAZINE HYDROCHLORIDE 25 MG/1
25 TABLET ORAL EVERY 6 HOURS PRN
Qty: 20 TABLET | Refills: 1 | Status: SHIPPED | OUTPATIENT
Start: 2022-06-09

## 2022-06-09 NOTE — PROGRESS NOTES
"Chief Complaint  GERD (Follow up )    Subjective  menopause 2018  Recently had updated upper and lower scopes done.  She had esophageal stricture for second time and history of colon polyps.  She is up-to-date on tetanus vaccination.  Has not received shingles vaccination.  Insomnia is stable with use of hydroxyzine.  Denies side effects and requests refills.  Osteoporosis has been noted on previous bone density test.  She has declined treatment other than calcium and vitamin D supplementation.  Is going on a trip soon and would like to have some promethazine to take if she gets nauseated due to motion sickness.  She has taken promethazine in the past with good results.        Geraldine Candelario presents to [facility]          Objective   Vital Signs:   Vitals:    06/09/22 1454   BP: 98/61   BP Location: Left arm   Patient Position: Sitting   Cuff Size: Adult   Pulse: 72   SpO2: 99%   Weight: 61.7 kg (136 lb)   Height: 170.2 cm (67\")      Body mass index is 21.3 kg/m².  Physical Exam  Vitals reviewed.   Constitutional:       General: She is not in acute distress.     Appearance: Normal appearance. She is well-developed.   Neck:      Thyroid: No thyroid mass, thyromegaly or thyroid tenderness.   Cardiovascular:      Rate and Rhythm: Normal rate and regular rhythm.      Pulses:           Posterior tibial pulses are 2+ on the right side and 2+ on the left side.      Heart sounds: Normal heart sounds.   Pulmonary:      Effort: Pulmonary effort is normal.      Breath sounds: Normal breath sounds.   Musculoskeletal:      Right lower leg: No edema.      Left lower leg: No edema.   Skin:     General: Skin is warm and dry.   Neurological:      General: No focal deficit present.      Mental Status: She is alert.   Psychiatric:         Attention and Perception: Attention normal.         Mood and Affect: Mood and affect normal.         Behavior: Behavior normal.          Result Review :{Labs  " Result Review  Imaging  Med Tab  Media :23}                                    Assessment and Plan    Diagnoses and all orders for this visit:    1. Osteoporosis without current pathological fracture, unspecified osteoporosis type (Primary)  Assessment & Plan:  Treatment options discussed.  Weightbearing exercise, calcium and vitamin D supplementation, consider trial of Fosamax or Prolia.  1 disadvantages her chronic GI symptoms could make her more at risk for having GI upset with use of oral osteoporosis medications.  She will let me know if she wants to pursue any prescription treatment for osteoporosis.      2. Well adult exam  Assessment & Plan:  Preventive care measures are discussed.  Encouraged she check coverage for shingles vaccination with member benefits with her insurance.  For shingles vaccine if she would like.    Orders:  -     CBC w AUTO Differential; Future  -     Comprehensive metabolic panel; Future  -     Lipid panel; Future  -     TSH; Future    3. Insomnia, unspecified type  -     hydrOXYzine (ATARAX) 25 MG tablet; Take 1 tablet by mouth 3 (Three) Times a Day As Needed for Itching (insomnia).  Dispense: 90 tablet; Refill: 1    4. H/O motion sickness  -     promethazine (PHENERGAN) 25 MG tablet; Take 1 tablet by mouth Every 6 (Six) Hours As Needed for Nausea or Vomiting.  Dispense: 20 tablet; Refill: 1      Follow Up    Return in about 1 year (around 6/9/2023).  Patient was given instructions and counseling regarding her condition or for health maintenance advice. Please see specific information pulled into the AVS if appropriate.

## 2022-06-10 NOTE — ASSESSMENT & PLAN NOTE
Treatment options discussed.  Weightbearing exercise, calcium and vitamin D supplementation, consider trial of Fosamax or Prolia.  1 disadvantages her chronic GI symptoms could make her more at risk for having GI upset with use of oral osteoporosis medications.  She will let me know if she wants to pursue any prescription treatment for osteoporosis.

## 2022-06-10 NOTE — ASSESSMENT & PLAN NOTE
Preventive care measures are discussed.  Encouraged she check coverage for shingles vaccination with member benefits with her insurance.  For shingles vaccine if she would like.

## 2022-06-22 ENCOUNTER — TELEPHONE (OUTPATIENT)
Dept: FAMILY MEDICINE CLINIC | Age: 57
End: 2022-06-22

## 2022-06-22 NOTE — TELEPHONE ENCOUNTER
----- Message from Bebe Harvey LPN sent at 6/20/2022  7:59 AM EDT -----      ----- Message -----  From: SYSTEM  Sent: 6/19/2022   1:25 AM EDT  To: [facility] [place] [facility] [place]

## 2022-06-23 ENCOUNTER — LAB (OUTPATIENT)
Dept: LAB | Facility: HOSPITAL | Age: 57
End: 2022-06-23

## 2022-06-23 DIAGNOSIS — Z00.00 WELL ADULT EXAM: ICD-10-CM

## 2022-06-23 LAB
ALBUMIN SERPL-MCNC: 4.7 G/DL (ref 3.5–5.2)
ALBUMIN/GLOB SERPL: 2.4 G/DL
ALP SERPL-CCNC: 80 U/L (ref 39–117)
ALT SERPL W P-5'-P-CCNC: 48 U/L (ref 1–33)
ANION GAP SERPL CALCULATED.3IONS-SCNC: 9.8 MMOL/L (ref 5–15)
AST SERPL-CCNC: 33 U/L (ref 1–32)
BASOPHILS # BLD AUTO: 0.04 10*3/MM3 (ref 0–0.2)
BASOPHILS NFR BLD AUTO: 1 % (ref 0–1.5)
BILIRUB SERPL-MCNC: 0.7 MG/DL (ref 0–1.2)
BUN SERPL-MCNC: 15 MG/DL (ref 6–20)
BUN/CREAT SERPL: 24.6 (ref 7–25)
CALCIUM SPEC-SCNC: 9.6 MG/DL (ref 8.6–10.5)
CHLORIDE SERPL-SCNC: 104 MMOL/L (ref 98–107)
CHOLEST SERPL-MCNC: 265 MG/DL (ref 0–200)
CO2 SERPL-SCNC: 25.2 MMOL/L (ref 22–29)
CREAT SERPL-MCNC: 0.61 MG/DL (ref 0.57–1)
DEPRECATED RDW RBC AUTO: 42.7 FL (ref 37–54)
EGFRCR SERPLBLD CKD-EPI 2021: 106.4 ML/MIN/1.73
EOSINOPHIL # BLD AUTO: 0.32 10*3/MM3 (ref 0–0.4)
EOSINOPHIL NFR BLD AUTO: 8.2 % (ref 0.3–6.2)
ERYTHROCYTE [DISTWIDTH] IN BLOOD BY AUTOMATED COUNT: 12.3 % (ref 12.3–15.4)
GLOBULIN UR ELPH-MCNC: 2 GM/DL
GLUCOSE SERPL-MCNC: 99 MG/DL (ref 65–99)
HCT VFR BLD AUTO: 37.9 % (ref 34–46.6)
HDLC SERPL-MCNC: 68 MG/DL (ref 40–60)
HGB BLD-MCNC: 12.5 G/DL (ref 12–15.9)
IMM GRANULOCYTES # BLD AUTO: 0.01 10*3/MM3 (ref 0–0.05)
IMM GRANULOCYTES NFR BLD AUTO: 0.3 % (ref 0–0.5)
LDLC SERPL CALC-MCNC: 176 MG/DL (ref 0–100)
LDLC/HDLC SERPL: 2.55 {RATIO}
LYMPHOCYTES # BLD AUTO: 1.24 10*3/MM3 (ref 0.7–3.1)
LYMPHOCYTES NFR BLD AUTO: 31.9 % (ref 19.6–45.3)
MCH RBC QN AUTO: 30.8 PG (ref 26.6–33)
MCHC RBC AUTO-ENTMCNC: 33 G/DL (ref 31.5–35.7)
MCV RBC AUTO: 93.3 FL (ref 79–97)
MONOCYTES # BLD AUTO: 0.26 10*3/MM3 (ref 0.1–0.9)
MONOCYTES NFR BLD AUTO: 6.7 % (ref 5–12)
NEUTROPHILS NFR BLD AUTO: 2.02 10*3/MM3 (ref 1.7–7)
NEUTROPHILS NFR BLD AUTO: 51.9 % (ref 42.7–76)
PLATELET # BLD AUTO: 207 10*3/MM3 (ref 140–450)
PMV BLD AUTO: 9.7 FL (ref 6–12)
POTASSIUM SERPL-SCNC: 4 MMOL/L (ref 3.5–5.2)
PROT SERPL-MCNC: 6.7 G/DL (ref 6–8.5)
RBC # BLD AUTO: 4.06 10*6/MM3 (ref 3.77–5.28)
SODIUM SERPL-SCNC: 139 MMOL/L (ref 136–145)
TRIGL SERPL-MCNC: 119 MG/DL (ref 0–150)
TSH SERPL DL<=0.05 MIU/L-ACNC: 3.51 UIU/ML (ref 0.27–4.2)
VLDLC SERPL-MCNC: 21 MG/DL (ref 5–40)
WBC NRBC COR # BLD: 3.89 10*3/MM3 (ref 3.4–10.8)

## 2022-06-23 PROCEDURE — 36415 COLL VENOUS BLD VENIPUNCTURE: CPT

## 2022-06-23 PROCEDURE — 80050 GENERAL HEALTH PANEL: CPT

## 2022-06-23 PROCEDURE — 80061 LIPID PANEL: CPT

## 2022-06-29 DIAGNOSIS — E78.2 MIXED HYPERLIPIDEMIA: Primary | ICD-10-CM

## 2022-06-30 NOTE — PROGRESS NOTES
Normal blood sugar, kidney, and thyroid function.  AST and ALT are liver enzymes.  Bilirubin also tells us about liver function.  AST and ALT are mildly elevated.  This could be due to regular use of ibuprofen, aleve, alcohol, high fat diet, illness or fatty liver infiltration.  I recommend repeating liver enzymes when we recheck your lipid panel in about 2 months.  Cholesterol is elevated.  Decrease intake of saturated fat, cholesterol, and fried foods.  Increase exercise such as walking.  These measures will help lower cholesterol.  If elevations persist, consider referral to dietitian or starting a cholesterol lowering medication such as pravastatin.

## 2022-09-08 ENCOUNTER — TELEPHONE (OUTPATIENT)
Dept: FAMILY MEDICINE CLINIC | Age: 57
End: 2022-09-08

## 2022-09-08 NOTE — TELEPHONE ENCOUNTER
----- Message from Erica Croft LPN sent at 9/6/2022  4:16 PM EDT -----      ----- Message -----  From: SYSTEM  Sent: 9/5/2022   1:48 AM EDT  To: [facility] [place] [facility] [place]

## 2023-05-19 RX ORDER — OMEPRAZOLE 40 MG/1
40 CAPSULE, DELAYED RELEASE ORAL DAILY
Qty: 30 CAPSULE | Refills: 2 | Status: SHIPPED | OUTPATIENT
Start: 2023-05-19

## 2023-10-19 ENCOUNTER — HOSPITAL ENCOUNTER (OUTPATIENT)
Dept: GENERAL RADIOLOGY | Facility: HOSPITAL | Age: 58
Discharge: HOME | End: 2023-10-19
Admitting: NURSE PRACTITIONER
Payer: COMMERCIAL

## 2023-10-19 ENCOUNTER — OFFICE VISIT (OUTPATIENT)
Dept: FAMILY MEDICINE CLINIC | Age: 58
End: 2023-10-19
Payer: COMMERCIAL

## 2023-10-19 VITALS
HEART RATE: 72 BPM | DIASTOLIC BLOOD PRESSURE: 63 MMHG | OXYGEN SATURATION: 97 % | WEIGHT: 141.6 LBS | SYSTOLIC BLOOD PRESSURE: 108 MMHG | BODY MASS INDEX: 22.22 KG/M2 | HEIGHT: 67 IN | TEMPERATURE: 98.3 F

## 2023-10-19 DIAGNOSIS — M25.561 PAIN AND SWELLING OF RIGHT KNEE: Primary | ICD-10-CM

## 2023-10-19 DIAGNOSIS — M25.561 PAIN AND SWELLING OF RIGHT KNEE: ICD-10-CM

## 2023-10-19 DIAGNOSIS — M25.461 PAIN AND SWELLING OF RIGHT KNEE: ICD-10-CM

## 2023-10-19 DIAGNOSIS — M25.461 PAIN AND SWELLING OF RIGHT KNEE: Primary | ICD-10-CM

## 2023-10-19 PROCEDURE — 73562 X-RAY EXAM OF KNEE 3: CPT

## 2023-10-19 PROCEDURE — 99213 OFFICE O/P EST LOW 20 MIN: CPT | Performed by: NURSE PRACTITIONER

## 2023-10-19 NOTE — PROGRESS NOTES
"Chief Complaint  Knee Pain ((Right) swelling; sx started 3 weeks ago after going up the stairs )    Subjective    Patient is in today with complaints of right knee swelling and pain that began 3 weeks ago.  Patient reports that she was taking a suitcase upstairs when it got off balance, and she took her knee to straighten it up.  She reports that pain comes and goes, and is sharp in nature, and swells up at times.  She reports noticing in the mirror when she is walking, that the leg is turning inward slightly.        Geraldine Candelario presents to [facility]  Knee Pain   The incident occurred more than 1 week ago. The injury mechanism was a direct blow. The pain is present in the right knee. The quality of the pain is described as shooting and aching. Pertinent negatives include no numbness or tingling. She reports no foreign bodies present. The symptoms are aggravated by weight bearing and movement. She has tried acetaminophen and ice for the symptoms. The treatment provided mild relief.       Objective   Vital Signs:  /63 (BP Location: Left arm, Patient Position: Sitting, Cuff Size: Adult)   Pulse 72   Temp 98.3 °F (36.8 °C) (Oral)   Ht 170.2 cm (67\")   Wt 64.2 kg (141 lb 9.6 oz)   SpO2 97% Comment: room air  BMI 22.18 kg/m²   Estimated body mass index is 22.18 kg/m² as calculated from the following:    Height as of this encounter: 170.2 cm (67\").    Weight as of this encounter: 64.2 kg (141 lb 9.6 oz).       BMI is within normal parameters. No other follow-up for BMI required.      Physical Exam  Cardiovascular:      Rate and Rhythm: Normal rate.   Pulmonary:      Effort: Pulmonary effort is normal.   Musculoskeletal:      Right knee: Swelling, effusion and crepitus present. No erythema or bony tenderness. Normal range of motion. Tenderness present over the lateral joint line. Normal pulse.   Skin:     General: Skin is warm and dry.   Neurological:      Mental Status: " She is alert and oriented to person, place, and time.   Psychiatric:         Mood and Affect: Mood normal.        Result Review :          PROCEDURE:  XR KNEE 3 VW RIGHT     COMPARISON: None     INDICATIONS:  LATERAL RIGHT KNEE PAIN AFTER TWIST X 3 WEEKS     FINDINGS:          No fracture or malalignment is identified.  A trace effusion is noted.  No loose body is seen.  No   significant arthritic change is evident.     IMPRESSION:                 1. No acute fracture or malalignment.               Rufus Sen M.D.         Electronically Signed and Approved By: Rufus Sen M.D. on 10/19/2023 at 16:05                 Assessment and Plan   Diagnoses and all orders for this visit:    1. Pain and swelling of right knee (Primary)  Comments:  Rest, ice, elevation and compression, ortho referral for possible MRI and further treatment plan  Orders:  -     XR Knee 3 View Right; Future  -     Ambulatory Referral to Orthopedic Surgery  -     methocarbamol (ROBAXIN) 500 MG tablet; Take 1 tablet by mouth 4 (Four) Times a Day.  Dispense: 40 tablet; Refill: 1  -     diclofenac (VOLTAREN) 50 MG EC tablet; Take 1 tablet by mouth 2 (Two) Times a Day As Needed (Right knee pain).  Dispense: 40 tablet; Refill: 0             Follow Up   Return if symptoms worsen or fail to improve.  Patient was given instructions and counseling regarding her condition or for health maintenance advice. Please see specific information pulled into the AVS if appropriate.

## 2023-10-20 RX ORDER — METHOCARBAMOL 500 MG/1
500 TABLET, FILM COATED ORAL 4 TIMES DAILY
Qty: 40 TABLET | Refills: 1 | Status: SHIPPED | OUTPATIENT
Start: 2023-10-20

## 2023-10-20 NOTE — PROGRESS NOTES
Right knee xray shows small amount of fluid, no significant arthritic or acute changes seen. Orthopedic referral placed due to duration and quality of pain for possible MRI. They will call her to set up this appointment.

## 2023-10-25 ENCOUNTER — OFFICE VISIT (OUTPATIENT)
Dept: ORTHOPEDIC SURGERY | Facility: CLINIC | Age: 58
End: 2023-10-25
Payer: COMMERCIAL

## 2023-10-25 VITALS — BODY MASS INDEX: 22.13 KG/M2 | HEIGHT: 67 IN | TEMPERATURE: 98.6 F | WEIGHT: 141 LBS

## 2023-10-25 DIAGNOSIS — Z78.0 POSTMENOPAUSAL: ICD-10-CM

## 2023-10-25 DIAGNOSIS — M25.561 PAIN AND SWELLING OF RIGHT KNEE: Primary | ICD-10-CM

## 2023-10-25 DIAGNOSIS — M25.461 PAIN AND SWELLING OF RIGHT KNEE: Primary | ICD-10-CM

## 2023-10-25 DIAGNOSIS — M81.0 OSTEOPOROSIS WITHOUT CURRENT PATHOLOGICAL FRACTURE, UNSPECIFIED OSTEOPOROSIS TYPE: ICD-10-CM

## 2023-10-25 PROCEDURE — 99204 OFFICE O/P NEW MOD 45 MIN: CPT | Performed by: PHYSICIAN ASSISTANT

## 2023-10-25 NOTE — PROGRESS NOTES
"Chief Complaint  Pain and Establish Care of the Right Knee    Subjective    History of Present Illness      Geraldine Candelario is a 58 y.o. female who presents to [facility] for new complaint of  right knee pain and swelling. Onset of pain was September 29, 2023. She notes her swelling and pain has improved since onset, although it has not resolved. She attributes this pain to losing her footing while carrying a heavy suitcase, while ascending stairs. She states she used her right foot to \"push it back up on the stairs\" and the following day, 09/30/2023, she developed the issues. She reports this was a sudden blast of pain, but has been intermittentsince. She adds experiencing soreness using the stairs. The patient locates the pain to the lateral aspect of her knee. She states on 10/21/2023, she wrapped an elastic bandage around her knee and wore her tennis shoes. She notes she normally wears dress shoes. The patient experienced pain ambulating on 10/21/2023 and 10/22/2023. She adds there was slight swelling. She affirms her knee has felt unstable. 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      Objective   Vital Signs:   Temp 98.6 °F (37 °C)   Ht 170.2 cm (67\")   Wt 64 kg (141 lb)   BMI 22.08 kg/m²     Physical Exam  Vitals signs and nursing note reviewed.   Constitutional:       Appearance: Normal appearance.   Pulmonary:      Effort: Pulmonary effort is normal.   Skin:     General: Skin is warm and dry.      Capillary Refill: Capillary refill takes less than 2 seconds.   Neurological:      General: No focal deficit present.      Mental Status: she is alert and oriented to person, place, and time. Mental status is at baseline.   Psychiatric:         Mood and Affect: Mood normal.         Behavior: Behavior normal.         Thought Content: Thought content normal.         Judgment: Judgment normal.     Ortho Exam   RIGHT " knee  Positive for swelling of the knee and ballotable patella with appearance of fluid in the suprapatellar space.   Positive for fullness, and tenderness with palpation, in the popliteal fossa.   Range of motion is restricted. Extension to Flexion: 0-115 degrees.  Positive for quadriceps inhibition secondary to joint distention.  No medial or lateral instability is noted.   Anterior and posterior drawer signs are negative.   Dorsalis pedis and posterior tibial artery pulses are palpable. Common peroneal nerve function is well preserved.  Positive Apley's grind.    Result Review :   Radiologic studies - see below for interpretation  RIGHT knee xrays  nonweightbearing 3 views were performed at [facility] on 10/19/23. Images were independently viewed and interpreted by myself, my impression as follows:  Findings: Mild tricompartmental degenerative changes, there is joint space narrowing in the medial compartment in comparison to the lateral compartment, as well as subchondral sclerosis noted at the medial tibial plateau and the patellofemoral compartment.    Results for orders placed during the hospital encounter of 10/19/23    XR Knee 3 View Right    Narrative  PROCEDURE: XR KNEE 3 VW RIGHT    COMPARISON: None    INDICATIONS: LATERAL RIGHT KNEE PAIN AFTER TWIST X 3 WEEKS    FINDINGS:  No fracture or malalignment is identified.  A trace effusion is noted.  No loose body is seen.  No  significant arthritic change is evident.    Impression  1. No acute fracture or malalignment.          Rufus Sen M.D.  Electronically Signed and Approved By: Rufus Sen M.D. on 10/19/2023 at 16:05            PROCEDURE  Procedures           Assessment   Assessment and Plan    Problem List Items Addressed This Visit          Musculoskeletal and Injuries    Osteoporosis without current pathological fracture    Relevant Orders    DEXA Bone Density Axial    Pain and swelling of right knee - Primary    Relevant  Orders    MRI Knee Right Without Contrast     Other Visit Diagnoses       Postmenopausal        Relevant Orders    DEXA Bone Density Axial            Follow Up   Discussion of any imaging in detail. Discussion of orthopaedic goals.  Risk, benefits, and merits of treatment alternatives reviewed with the patient. Treatment alternatives include: oral anti-inflammatories/NSAID, intra-articular steroid injection, bracing, and further imaging/testing. Also discussed aspiration of the knee, but she prefers to proceed with MRI to make sure there is not a meniscal tear before doing any treatment.  Ice, heat, and/or modalities as beneficial  To schedule MRI of right knee to evaluate for meniscal tear/injury to lateral knee  Patient is encouraged to call or return for any issues or concerns.  No brace was given at today's visit. She will use ace wrap or OTC brace  Follow up based on timing of testing results  Patient was given instructions and counseling regarding her condition or for health maintenance advice. Please see specific information pulled into the AVS if appropriate.     Darren Gaytan PA-C   Date of Encounter: 10/25/2023   Electronically signed by Darren Gaytan PA-C, 10/25/23, 2:59 PM EDT.     EMR Dragon/Transcription disclaimer:  Much of this encounter note is an electronic transcription/translation of spoken language to printed text. The electronic translation of spoken language may permit erroneous, or at times, nonsensical words or phrases to be inadvertently transcribed; Although I have reviewed the note for such errors, some may still exist.     Transcribed from ambient dictation for Darren Gaytan PA-C by Nohelia Ng.  10/25/23   17:07 EDT    Patient or patient representative verbalized consent to the visit recording.  I have personally performed the services described in this document as transcribed by the above individual, and it is both accurate and complete.  Darren Gaytan  AIRAM  10/31/2023  20:47 EDT

## 2023-10-27 ENCOUNTER — TELEPHONE (OUTPATIENT)
Dept: ORTHOPEDIC SURGERY | Facility: CLINIC | Age: 58
End: 2023-10-27
Payer: COMMERCIAL

## 2023-10-27 NOTE — TELEPHONE ENCOUNTER
Provider: OSEAS    Caller: DEMARIO    Relationship to Patient: SELF    Pharmacy:     Phone Number: [phone]    Reason for Call: PT CALLING TO FIND OUT WHERE OR WHAT SHE IS SUPPOSED TO DO IN THE FOLLOW UP OF HER MRI RESULTS THT SHE SHOULD HAVE WITH MESFIN FAROOQ BEING THAT SHE JUST ORDERED THE MRI AND IT WAS SCHEDULED BUT SINCE WE ARE NOT MAKING ANY NEW APPTS SHE IS CONCERNED

## 2023-10-27 NOTE — TELEPHONE ENCOUNTER
LEFT VOICEMAIL FOR PATIENT THAT MESFIN WILL CALL HER WITH HER RESULTS AND NO APPOINTMENT NECESSARY AT THIS TIME.    OK FOR HUB TO READ

## 2023-11-10 NOTE — ANESTHESIA PREPROCEDURE EVALUATION
Anesthesia Evaluation     Patient summary reviewed and Nursing notes reviewed                Airway   Mallampati: I  TM distance: >3 FB  Neck ROM: full  No difficulty expected  Dental      Pulmonary - negative pulmonary ROS and normal exam    breath sounds clear to auscultation  Cardiovascular - negative cardio ROS and normal exam    Rhythm: regular  Rate: normal        Neuro/Psych- negative ROS  GI/Hepatic/Renal/Endo    (+)  GERD, GI bleeding ,     Musculoskeletal (-) negative ROS    Abdominal    Substance History - negative use     OB/GYN negative ob/gyn ROS         Other                        Anesthesia Plan    ASA 1     general     intravenous induction     Anesthetic plan, all risks, benefits, and alternatives have been provided, discussed and informed consent has been obtained with: patient.        CODE STATUS:        [[id number]]

## 2023-12-04 ENCOUNTER — HOSPITAL ENCOUNTER (OUTPATIENT)
Dept: BONE DENSITY | Facility: HOSPITAL | Age: 58
Discharge: HOME | End: 2023-12-04
Admitting: PHYSICIAN ASSISTANT
Payer: COMMERCIAL

## 2023-12-04 DIAGNOSIS — M81.0 OSTEOPOROSIS WITHOUT CURRENT PATHOLOGICAL FRACTURE, UNSPECIFIED OSTEOPOROSIS TYPE: ICD-10-CM

## 2023-12-04 DIAGNOSIS — Z78.0 POSTMENOPAUSAL: ICD-10-CM

## 2023-12-04 PROCEDURE — 77080 DXA BONE DENSITY AXIAL: CPT

## 2023-12-05 NOTE — PROGRESS NOTES
Diana,     This patient is a mutual patient and I ordered a DEXA, which shows osteoporosis of the lumbar spine. The T score is -3.4, which technically she may be a better candidate for Evenity with the T score being that abnormal, but I wanted to see if your office can see about Prolia since you administer it in your office.     Thanks,     Darren

## 2023-12-27 ENCOUNTER — OFFICE VISIT (OUTPATIENT)
Dept: FAMILY MEDICINE CLINIC | Age: 58
End: 2023-12-27
Payer: COMMERCIAL

## 2023-12-27 VITALS
DIASTOLIC BLOOD PRESSURE: 55 MMHG | OXYGEN SATURATION: 98 % | HEART RATE: 76 BPM | SYSTOLIC BLOOD PRESSURE: 103 MMHG | WEIGHT: 145.4 LBS | BODY MASS INDEX: 22.82 KG/M2 | HEIGHT: 67 IN

## 2023-12-27 DIAGNOSIS — M25.50 ARTHRALGIA, UNSPECIFIED JOINT: ICD-10-CM

## 2023-12-27 DIAGNOSIS — M25.461 PAIN AND SWELLING OF RIGHT KNEE: Primary | ICD-10-CM

## 2023-12-27 DIAGNOSIS — M25.561 PAIN AND SWELLING OF RIGHT KNEE: Primary | ICD-10-CM

## 2023-12-27 DIAGNOSIS — E78.2 MIXED HYPERLIPIDEMIA: ICD-10-CM

## 2023-12-27 DIAGNOSIS — M81.0 OSTEOPOROSIS WITHOUT CURRENT PATHOLOGICAL FRACTURE, UNSPECIFIED OSTEOPOROSIS TYPE: ICD-10-CM

## 2023-12-27 NOTE — ASSESSMENT & PLAN NOTE
Symptoms are increasing again.  Proceed with MRI of the right knee without contrast.  If abnormality is noted she will need to see orthopedics.  Otherwise I would recommend she go to physical therapy.

## 2023-12-27 NOTE — PROGRESS NOTES
"Chief Complaint  Follow-up (Discuss prolia injection )    Subjective          Geraldine Candelario presents to [facility]     Patient is 58-year-old female who is here today to follow-up regarding a bone density test ordered by Darren Gaytan, orthopedics.  Patient was seen and wrist doctor for right knee pain and she ordered a bone density test which showed further diminished bone density of the lumbar spine at -3.4 but osteopenia of the hip.  Patient had previously deferred prescription medication for bone density.  She had taken calcium and vitamin D in the past but not currently.  She is physically active and gets weightbearing exercise.  She recently twisted the right knee and was to get an MRI but pain improved and therefore MRI was not done per orthopedics.  Patient states the knee pain is increasing again she would like to proceed with MRI but Darren Gaytan who is changed locations aware she practice at this.    Patient has an extra bony growth under her tongue that is monitored by dentist and wonders if medication for bone density would worsen this condition.  She will meet with dentist soon to discuss.  Patient will consider treatment with Prolia, Fosamax, or Boniva.    Patient also reports she gets some intermittent arthralgia diffusely and would like to have lab work to look for causes of joint pain other than osteoarthritis.     Objective   Vital Signs:   Vitals:    12/27/23 1440   BP: 103/55   BP Location: Left arm   Patient Position: Sitting   Cuff Size: Adult   Pulse: 76   SpO2: 98%  Comment: room air   Weight: 66 kg (145 lb 6.4 oz)   Height: 170.2 cm (67\")       Wt Readings from Last 3 Encounters:   12/27/23 66 kg (145 lb 6.4 oz)   10/25/23 64 kg (141 lb)   10/19/23 64.2 kg (141 lb 9.6 oz)      BP Readings from Last 3 Encounters:   12/27/23 103/55   10/19/23 108/63   07/05/23 114/70       Body mass index is 22.77 kg/m².    BMI is within normal parameters. No other " follow-up for BMI required.       Physical Exam      Current Outpatient Medications:     cetirizine (zyrTEC) 10 MG tablet, Take 1 tablet by mouth Daily., Disp: , Rfl:     diclofenac (VOLTAREN) 50 MG EC tablet, Take 1 tablet by mouth 2 (Two) Times a Day As Needed (Right knee pain)., Disp: 40 tablet, Rfl: 0    fluticasone (FLONASE) 50 MCG/ACT nasal spray, 2 sprays into the nostril(s) as directed by provider Daily., Disp: , Rfl:     hydrOXYzine (ATARAX) 25 MG tablet, Take 1 tablet by mouth 3 (Three) Times a Day As Needed for Itching (insomnia)., Disp: 90 tablet, Rfl: 1    methocarbamol (ROBAXIN) 500 MG tablet, Take 1 tablet by mouth 4 (Four) Times a Day., Disp: 40 tablet, Rfl: 1    metroNIDAZOLE (METROGEL) 0.75 % gel, APPLY TOPICALLY TO FACE TWICE DAILY AS NEEDED, Disp: , Rfl:     omeprazole (priLOSEC) 40 MG capsule, Take 1 capsule by mouth Daily., Disp: 30 capsule, Rfl: 11    promethazine (PHENERGAN) 25 MG tablet, Take 1 tablet by mouth Every 6 (Six) Hours As Needed for Nausea or Vomiting., Disp: 20 tablet, Rfl: 1   Past Medical History:   Diagnosis Date    Colon polyps     Esophageal dilatation 2012    Esophageal stricture 2012    GERD (gastroesophageal reflux disease)     Rectal bleeding      Allergies   Allergen Reactions    Latex Unknown - Low Severity     ALLERGY TESTING ONLY NEVER HAS HAD A REACTION               Result Review :          XR Knee 3 View Right    Result Date: 10/19/2023    1. No acute fracture or malalignment.      Rufus Sen M.D.       Electronically Signed and Approved By: Rufus Sen M.D. on 10/19/2023 at 16:05                      Social History     Tobacco Use   Smoking Status Never    Passive exposure: Past   Smokeless Tobacco Never           Assessment and Plan    Diagnoses and all orders for this visit:    1. Pain and swelling of right knee (Primary)  Assessment & Plan:  Symptoms are increasing again.  Proceed with MRI of the right knee without contrast.  If abnormality is noted she  will need to see orthopedics.  Otherwise I would recommend she go to physical therapy.    Orders:  -     MRI Knee Right Without Contrast; Future    2. Arthralgia, unspecified joint  -     SUGAR Direct Reflex to 11 Biomarker; Future  -     Cyclic citrul peptide antibody, IgG/IgA; Future  -     CK; Future  -     C-reactive protein; Future  -     Rheumatoid Factor; Future  -     Sedimentation rate; Future  -     Uric acid; Future    3. Osteoporosis without current pathological fracture, unspecified osteoporosis type  Assessment & Plan:  Patient considering her options and will also discuss with her dentist.  Follow-up after labs done.  Further recommendations pending lab results.    Orders:  -     Vitamin D 25 hydroxy; Future  -     Comprehensive metabolic panel; Future    4. Mixed hyperlipidemia  -     Lipid panel; Future  -     Comprehensive metabolic panel; Future        Follow Up    No follow-ups on file.  Patient was given instructions and counseling regarding her condition or for health maintenance advice. Please see specific information pulled into the AVS if appropriate.

## 2023-12-27 NOTE — ASSESSMENT & PLAN NOTE
Patient considering her options and will also discuss with her dentist.  Follow-up after labs done.  Further recommendations pending lab results.

## 2024-01-05 ENCOUNTER — LAB (OUTPATIENT)
Dept: LAB | Facility: HOSPITAL | Age: 59
End: 2024-01-05
Payer: COMMERCIAL

## 2024-01-05 DIAGNOSIS — E78.2 MIXED HYPERLIPIDEMIA: ICD-10-CM

## 2024-01-05 DIAGNOSIS — M81.0 OSTEOPOROSIS WITHOUT CURRENT PATHOLOGICAL FRACTURE, UNSPECIFIED OSTEOPOROSIS TYPE: ICD-10-CM

## 2024-01-05 DIAGNOSIS — M25.50 ARTHRALGIA, UNSPECIFIED JOINT: ICD-10-CM

## 2024-01-05 LAB
25(OH)D3 SERPL-MCNC: 29.8 NG/ML (ref 30–100)
ALBUMIN SERPL-MCNC: 5.1 G/DL (ref 3.5–5.2)
ALBUMIN/GLOB SERPL: 2.6 G/DL
ALP SERPL-CCNC: 91 U/L (ref 39–117)
ALT SERPL W P-5'-P-CCNC: 21 U/L (ref 1–33)
ANION GAP SERPL CALCULATED.3IONS-SCNC: 11 MMOL/L (ref 5–15)
AST SERPL-CCNC: 43 U/L (ref 1–32)
BILIRUB SERPL-MCNC: 0.8 MG/DL (ref 0–1.2)
BUN SERPL-MCNC: 13 MG/DL (ref 6–20)
BUN/CREAT SERPL: 20.3 (ref 7–25)
CALCIUM SPEC-SCNC: 9.9 MG/DL (ref 8.6–10.5)
CHLORIDE SERPL-SCNC: 102 MMOL/L (ref 98–107)
CHOLEST SERPL-MCNC: 327 MG/DL (ref 0–200)
CHROMATIN AB SERPL-ACNC: 13 IU/ML (ref 0–14)
CK SERPL-CCNC: 49 U/L (ref 20–180)
CO2 SERPL-SCNC: 27 MMOL/L (ref 22–29)
CREAT SERPL-MCNC: 0.64 MG/DL (ref 0.57–1)
CRP SERPL-MCNC: <0.3 MG/DL (ref 0–0.5)
EGFRCR SERPLBLD CKD-EPI 2021: 103.9 ML/MIN/1.73
ERYTHROCYTE [SEDIMENTATION RATE] IN BLOOD: 4 MM/HR (ref 0–30)
GLOBULIN UR ELPH-MCNC: 2 GM/DL
GLUCOSE SERPL-MCNC: 103 MG/DL (ref 65–99)
HDLC SERPL-MCNC: 67 MG/DL (ref 40–60)
LDLC SERPL CALC-MCNC: 237 MG/DL (ref 0–100)
LDLC/HDLC SERPL: 3.5 {RATIO}
POTASSIUM SERPL-SCNC: 4.3 MMOL/L (ref 3.5–5.2)
PROT SERPL-MCNC: 7.1 G/DL (ref 6–8.5)
SODIUM SERPL-SCNC: 140 MMOL/L (ref 136–145)
TRIGL SERPL-MCNC: 128 MG/DL (ref 0–150)
URATE SERPL-MCNC: 3.3 MG/DL (ref 2.4–5.7)
VLDLC SERPL-MCNC: 23 MG/DL (ref 5–40)

## 2024-01-05 PROCEDURE — 80053 COMPREHEN METABOLIC PANEL: CPT

## 2024-01-05 PROCEDURE — 84550 ASSAY OF BLOOD/URIC ACID: CPT

## 2024-01-05 PROCEDURE — 85652 RBC SED RATE AUTOMATED: CPT

## 2024-01-05 PROCEDURE — 86431 RHEUMATOID FACTOR QUANT: CPT

## 2024-01-05 PROCEDURE — 80061 LIPID PANEL: CPT

## 2024-01-05 PROCEDURE — 36415 COLL VENOUS BLD VENIPUNCTURE: CPT

## 2024-01-05 PROCEDURE — 82306 VITAMIN D 25 HYDROXY: CPT

## 2024-01-05 PROCEDURE — 82550 ASSAY OF CK (CPK): CPT

## 2024-01-05 PROCEDURE — 86200 CCP ANTIBODY: CPT

## 2024-01-05 PROCEDURE — 86140 C-REACTIVE PROTEIN: CPT

## 2024-01-05 PROCEDURE — 86038 ANTINUCLEAR ANTIBODIES: CPT

## 2024-01-06 LAB — CCP IGA+IGG SERPL IA-ACNC: 7 UNITS (ref 0–19)

## 2024-01-07 NOTE — PROGRESS NOTES
Liver function is stable.  Cholesterol is quite high.  HDL is good cholesterol but LDL is bad cholesterol.  I recommend that you consider starting a prescription cholesterol lowering medication.   You do not have gout or rheumatoid arthritis.  Vitamin d is slightly low.  I recommend at least 1000 IU daily of over the counter vitamin d.  Kidney function is normal.

## 2024-01-08 ENCOUNTER — TELEPHONE (OUTPATIENT)
Dept: FAMILY MEDICINE CLINIC | Age: 59
End: 2024-01-08
Payer: COMMERCIAL

## 2024-01-08 LAB — ANA SER QL: NEGATIVE

## 2024-01-08 NOTE — TELEPHONE ENCOUNTER
Caller: Geraldine Candelario    Relationship to patient: Self    Best call back number: [phone]    Patient is needing: PATIENT CALLED IN AND IS REQUESTING A CALL BACK REGARDING TURNAROUND TIME FOR RESULTS OF UPCOMING MRI. PATIENT HAS AN APPOINTMENT WITH PCP ON 1/18/2024 AND WOULD LIKE TO KNOW IF SHE WILL HAVE THE RESULTS BACK SINCE THE MRI IS ON 1/17/2024

## 2024-01-08 NOTE — TELEPHONE ENCOUNTER
Lmom inf pt that typically we get results within 24 hours unless they have to request outside films or the radiologist doesn't sign off on it.

## 2024-01-17 ENCOUNTER — HOSPITAL ENCOUNTER (OUTPATIENT)
Dept: MRI IMAGING | Facility: HOSPITAL | Age: 59
Discharge: HOME | End: 2024-01-17
Admitting: NURSE PRACTITIONER
Payer: COMMERCIAL

## 2024-01-17 DIAGNOSIS — M25.561 PAIN AND SWELLING OF RIGHT KNEE: ICD-10-CM

## 2024-01-17 DIAGNOSIS — M25.461 PAIN AND SWELLING OF RIGHT KNEE: ICD-10-CM

## 2024-01-17 PROCEDURE — 73721 MRI JNT OF LWR EXTRE W/O DYE: CPT

## 2024-01-18 ENCOUNTER — OFFICE VISIT (OUTPATIENT)
Dept: FAMILY MEDICINE CLINIC | Age: 59
End: 2024-01-18
Payer: COMMERCIAL

## 2024-01-18 VITALS
OXYGEN SATURATION: 98 % | SYSTOLIC BLOOD PRESSURE: 107 MMHG | HEIGHT: 67 IN | WEIGHT: 144 LBS | HEART RATE: 60 BPM | DIASTOLIC BLOOD PRESSURE: 74 MMHG | BODY MASS INDEX: 22.6 KG/M2

## 2024-01-18 DIAGNOSIS — M25.461 PAIN AND SWELLING OF RIGHT KNEE: ICD-10-CM

## 2024-01-18 DIAGNOSIS — M25.561 PAIN AND SWELLING OF RIGHT KNEE: ICD-10-CM

## 2024-01-18 DIAGNOSIS — E78.2 MIXED HYPERLIPIDEMIA: ICD-10-CM

## 2024-01-18 DIAGNOSIS — M94.261 CHONDROMALACIA OF KNEE, RIGHT: Primary | ICD-10-CM

## 2024-01-18 DIAGNOSIS — M81.0 OSTEOPOROSIS WITHOUT CURRENT PATHOLOGICAL FRACTURE, UNSPECIFIED OSTEOPOROSIS TYPE: ICD-10-CM

## 2024-01-18 DIAGNOSIS — Z87.898 H/O MOTION SICKNESS: ICD-10-CM

## 2024-01-18 PROCEDURE — 99214 OFFICE O/P EST MOD 30 MIN: CPT | Performed by: NURSE PRACTITIONER

## 2024-01-18 RX ORDER — ATORVASTATIN CALCIUM 10 MG/1
10 TABLET, FILM COATED ORAL NIGHTLY
Qty: 30 TABLET | Refills: 5 | Status: SHIPPED | OUTPATIENT
Start: 2024-01-18 | End: 2024-07-16

## 2024-01-18 RX ORDER — SCOLOPAMINE TRANSDERMAL SYSTEM 1 MG/1
1 PATCH, EXTENDED RELEASE TRANSDERMAL
Qty: 4 PATCH | Refills: 1 | Status: SHIPPED | OUTPATIENT
Start: 2024-01-18

## 2024-01-18 RX ORDER — ONDANSETRON 4 MG/1
4 TABLET, FILM COATED ORAL EVERY 8 HOURS PRN
Qty: 30 TABLET | Refills: 0 | Status: SHIPPED | OUTPATIENT
Start: 2024-01-18

## 2024-01-18 NOTE — PROGRESS NOTES
"Chief Complaint  Hyperlipidemia (6 month follow up ) and Joint Pain    Subjective          Geraldine Candelario presents to [facility]     Patient is 58-year-old female who is here today to follow-up regarding osteoporosis.  She would like to start receiving Prolia injections.  She will be going to [place] in March for 2 to 3 weeks.    Regarding hyperlipidemia, there is a strong family history for hyperlipidemia.  Recent levels were quite elevated.  She would like to start a statin medication to help lower cholesterol.  Patient identifies some changes in her diet she would also like to implement to help lower cholesterol.    Regarding right knee pain, her recent MRI does show variety of degenerative change along with some chondral malacia.  We are implementing referral to Dr. Hoffmann for further evaluation.  Patient does request a refill of diclofenac to take 50 mg twice daily as needed.    History of motion sickness and will be on extended flight to [place] in March.  Requests further treatment for motion sickness.  Also something to have on hand for nausea.     Objective   Vital Signs:   Vitals:    01/18/24 1530   BP: 107/74   BP Location: Right arm   Patient Position: Sitting   Cuff Size: Adult   Pulse: 60   SpO2: 98%   Weight: 65.3 kg (144 lb)   Height: 170.2 cm (67\")       Wt Readings from Last 3 Encounters:   01/18/24 65.3 kg (144 lb)   01/17/24 65.8 kg (145 lb)   12/27/23 66 kg (145 lb 6.4 oz)      BP Readings from Last 3 Encounters:   01/18/24 107/74   12/27/23 103/55   10/19/23 108/63       Body mass index is 22.55 kg/m².    BMI is within normal parameters. No other follow-up for BMI required.       Physical Exam  Vitals reviewed.   Constitutional:       General: She is not in acute distress.     Appearance: Normal appearance. She is well-developed.   Cardiovascular:      Rate and Rhythm: Normal rate and regular rhythm.      Heart sounds: Normal heart sounds.   Pulmonary:      Effort: " Pulmonary effort is normal.      Breath sounds: Normal breath sounds.   Musculoskeletal:      Right lower leg: No edema.      Left lower leg: No edema.   Skin:     General: Skin is warm and dry.   Neurological:      General: No focal deficit present.      Mental Status: She is alert.   Psychiatric:         Attention and Perception: Attention normal.         Mood and Affect: Mood and affect normal.         Behavior: Behavior normal.           Current Outpatient Medications:     cetirizine (zyrTEC) 10 MG tablet, Take 1 tablet by mouth Daily., Disp: , Rfl:     diclofenac (VOLTAREN) 50 MG EC tablet, Take 1 tablet by mouth 2 (Two) Times a Day As Needed (Right knee pain)., Disp: 80 tablet, Rfl: 0    fluticasone (FLONASE) 50 MCG/ACT nasal spray, 2 sprays into the nostril(s) as directed by provider Daily., Disp: , Rfl:     hydrOXYzine (ATARAX) 25 MG tablet, Take 1 tablet by mouth 3 (Three) Times a Day As Needed for Itching (insomnia)., Disp: 90 tablet, Rfl: 1    methocarbamol (ROBAXIN) 500 MG tablet, Take 1 tablet by mouth 4 (Four) Times a Day., Disp: 40 tablet, Rfl: 1    metroNIDAZOLE (METROGEL) 0.75 % gel, APPLY TOPICALLY TO FACE TWICE DAILY AS NEEDED, Disp: , Rfl:     omeprazole (priLOSEC) 40 MG capsule, Take 1 capsule by mouth Daily., Disp: 30 capsule, Rfl: 11    atorvastatin (LIPITOR) 10 MG tablet, Take 1 tablet by mouth Every Night for 180 days., Disp: 30 tablet, Rfl: 5    denosumab (PROLIA) 60 MG/ML solution prefilled syringe syringe, Inject 1 mL under the skin into the appropriate area as directed 1 (One) Time for 1 dose., Disp: 1 mL, Rfl: 0    ondansetron (Zofran) 4 MG tablet, Take 1 tablet by mouth Every 8 (Eight) Hours As Needed for Nausea or Vomiting., Disp: 30 tablet, Rfl: 0    Scopolamine 1 MG/3DAYS patch, Place 1 patch on the skin as directed by provider Every 72 (Seventy-Two) Hours., Disp: 4 patch, Rfl: 1   Past Medical History:   Diagnosis Date    Colon polyps     Esophageal dilatation 2012    Esophageal  stricture 2012    GERD (gastroesophageal reflux disease)     Rectal bleeding      Allergies   Allergen Reactions    Latex Unknown - Low Severity     ALLERGY TESTING ONLY NEVER HAS HAD A REACTION               Result Review :     Common labs          1/5/2024    09:58   Common Labs   Glucose 103    BUN 13    Creatinine 0.64    Sodium 140    Potassium 4.3    Chloride 102    Calcium 9.9    Albumin 5.1    Total Bilirubin 0.8    Alkaline Phosphatase 91    AST (SGOT) 43    ALT (SGPT) 21    Total Cholesterol 327    Triglycerides 128    HDL Cholesterol 67    LDL Cholesterol  237    Uric Acid 3.3         MRI Knee Right Without Contrast    Result Date: 1/17/2024    1. Advanced central patellofemoral and medial tibiofemoral chondromalacia with small foci of subchondral edema/cyst formation. 2. No definite acute ligamentous or meniscal injury.  Probable degenerative changes in the posterior medial and lateral meniscus. 3. Edema in the quadriceps fat pad which may be associated with fat pad impingement.     BRUNO GUERRA MD       Electronically Signed and Approved By: BRUNO GUERRA MD on 1/17/2024 at 17:48             XR Knee 3 View Right    Result Date: 10/19/2023    1. No acute fracture or malalignment.      Rufus Sen M.D.       Electronically Signed and Approved By: Rufus Sen M.D. on 10/19/2023 at 16:05                      Social History     Tobacco Use   Smoking Status Never    Passive exposure: Past   Smokeless Tobacco Never           Assessment and Plan    Diagnoses and all orders for this visit:    1. Chondromalacia of knee, right (Primary)  -     Ambulatory Referral to Orthopedic Surgery    2. H/O motion sickness  -     Scopolamine 1 MG/3DAYS patch; Place 1 patch on the skin as directed by provider Every 72 (Seventy-Two) Hours.  Dispense: 4 patch; Refill: 1  -     ondansetron (Zofran) 4 MG tablet; Take 1 tablet by mouth Every 8 (Eight) Hours As Needed for Nausea or Vomiting.  Dispense: 30 tablet; Refill: 0    3.  Pain and swelling of right knee  Comments:  Rest, ice, elevation and compression, ortho referral for possible MRI and further treatment plan  Orders:  -     diclofenac (VOLTAREN) 50 MG EC tablet; Take 1 tablet by mouth 2 (Two) Times a Day As Needed (Right knee pain).  Dispense: 80 tablet; Refill: 0    4. Mixed hyperlipidemia  Assessment & Plan:  Report any myalgia or other signs of intolerance if indicated.  Repeat fasting lipid panel in 2 months.    Orders:  -     atorvastatin (LIPITOR) 10 MG tablet; Take 1 tablet by mouth Every Night for 180 days.  Dispense: 30 tablet; Refill: 5  -     Lipid panel; Future  -     Comprehensive metabolic panel; Future    5. Osteoporosis without current pathological fracture, unspecified osteoporosis type  Assessment & Plan:  pending approval by insurance.    Orders:  -     denosumab (PROLIA) 60 MG/ML solution prefilled syringe syringe; Inject 1 mL under the skin into the appropriate area as directed 1 (One) Time for 1 dose.  Dispense: 1 mL; Refill: 0        Follow Up    No follow-ups on file.  Patient was given instructions and counseling regarding her condition or for health maintenance advice. Please see specific information pulled into the AVS if appropriate.

## 2024-01-18 NOTE — ASSESSMENT & PLAN NOTE
Report any myalgia or other signs of intolerance if indicated.  Repeat fasting lipid panel in 2 months.

## 2024-01-18 NOTE — PROGRESS NOTES
Please call patient.  Degenerative changes to cartilage behind knee cap and possible cyst formation.  Ligaments intact.   Will need to see orthopedics.  Previously saw don robb.  She can call the office of don robb [phone] and see where don may be seeing patients or we can refer to new orthopedics provider.

## 2024-01-19 DIAGNOSIS — M22.41 CHONDROMALACIA OF PATELLA, RIGHT: Primary | ICD-10-CM

## 2024-01-19 DIAGNOSIS — M17.11 PRIMARY OSTEOARTHRITIS OF RIGHT KNEE: ICD-10-CM

## 2024-01-19 NOTE — PROGRESS NOTES
Called and discussed MRI results and treatment options. Recommended conservative treatment, with steroid injections or gel injections-depending on insurance, for as long as possible and she will then need a knee replacement. Discussed it is best to hold off until closer to age 62, if possible, for knee replacement. She advised she is not having an pain at this time and it is coming and going. We discussed her upcoming trip to [place] and I recommended an injection of steroid before the trip, or advised I could prescribe an oral steroid that she could fill and take with her on the trip in case she needs it.  She will either call my new office to schedule, if needed, or will follow up with Dr. Hoffmann as scheduled.

## 2024-01-22 ENCOUNTER — TELEPHONE (OUTPATIENT)
Dept: FAMILY MEDICINE CLINIC | Age: 59
End: 2024-01-22
Payer: COMMERCIAL

## 2024-01-22 NOTE — TELEPHONE ENCOUNTER
Pt due for Prolia - Initial    Last Dexa -3.4  DEXA Bone Density Axial (12/04/2023 11:29)     Last CMP- Calcium 9.9  Comprehensive metabolic panel (01/05/2024 09:58)     Last office Visit   Office Visit with Diana Kan APRN (01/18/2024)     Cancelled Prolia at rx at [facility], spoke to Natali Pharmacist. Left voicemail for pt explaining process of Prolia.

## 2024-01-29 ENCOUNTER — OFFICE VISIT (OUTPATIENT)
Dept: ORTHOPEDIC SURGERY | Facility: CLINIC | Age: 59
End: 2024-01-29
Payer: COMMERCIAL

## 2024-01-29 VITALS
OXYGEN SATURATION: 97 % | BODY MASS INDEX: 22.6 KG/M2 | HEART RATE: 67 BPM | SYSTOLIC BLOOD PRESSURE: 115 MMHG | HEIGHT: 67 IN | DIASTOLIC BLOOD PRESSURE: 80 MMHG | WEIGHT: 144 LBS

## 2024-01-29 DIAGNOSIS — M94.20 CHONDROMALACIA: ICD-10-CM

## 2024-01-29 DIAGNOSIS — M77.11 RIGHT LATERAL EPICONDYLITIS: Primary | ICD-10-CM

## 2024-01-29 DIAGNOSIS — M25.561 RIGHT KNEE PAIN, UNSPECIFIED CHRONICITY: ICD-10-CM

## 2024-01-29 PROCEDURE — 99203 OFFICE O/P NEW LOW 30 MIN: CPT | Performed by: ORTHOPAEDIC SURGERY

## 2024-01-29 PROCEDURE — 20610 DRAIN/INJ JOINT/BURSA W/O US: CPT | Performed by: ORTHOPAEDIC SURGERY

## 2024-01-29 RX ORDER — LIDOCAINE HYDROCHLORIDE 10 MG/ML
5 INJECTION, SOLUTION INFILTRATION; PERINEURAL
Status: COMPLETED | OUTPATIENT
Start: 2024-01-29 | End: 2024-01-29

## 2024-01-29 RX ORDER — TRIAMCINOLONE ACETONIDE 40 MG/ML
40 INJECTION, SUSPENSION INTRA-ARTICULAR; INTRAMUSCULAR
Status: COMPLETED | OUTPATIENT
Start: 2024-01-29 | End: 2024-01-29

## 2024-01-29 RX ADMIN — LIDOCAINE HYDROCHLORIDE 5 ML: 10 INJECTION, SOLUTION INFILTRATION; PERINEURAL at 15:50

## 2024-01-29 RX ADMIN — TRIAMCINOLONE ACETONIDE 40 MG: 40 INJECTION, SUSPENSION INTRA-ARTICULAR; INTRAMUSCULAR at 15:50

## 2024-01-29 NOTE — PROGRESS NOTES
"Chief Complaint  Initial Evaluation of the Right Knee and Initial Evaluation of the Right Elbow     Subjective      Geraldine Candelario presents to [facility] for initial evaluation of the right knee.  She had a MRI on 1/17/24 and is here to review. She has pain on the medial aspect of the knee.  She has difficulty with prolonged standing, ambulation and stairs. She notes there are times when it wants to give out on her.  She has had no recent injury or fall. She has pain in the right elbow.  She doesn't do any repetitive gripping.     Allergies   Allergen Reactions    Latex Unknown - Low Severity     ALLERGY TESTING ONLY NEVER HAS HAD A REACTION        Social History     Socioeconomic History    Marital status:    Tobacco Use    Smoking status: Never     Passive exposure: Past    Smokeless tobacco: Never   Vaping Use    Vaping Use: Never used   Substance and Sexual Activity    Alcohol use: Yes     Comment: moderate use    Drug use: Never    Sexual activity: Defer        I reviewed the patient's chief complaint, history of present illness, review of systems, past medical history, surgical history, family history, social history, medications, and allergy list.     Review of Systems     Constitutional: Denies fevers, chills, weight loss  Cardiovascular: Denies chest pain, shortness of breath  Skin: Denies rashes, acute skin changes  Neurologic: Denies headache, loss of consciousness        Vital Signs:   /80   Pulse 67   Ht 170.2 cm (67\")   Wt 65.3 kg (144 lb)   SpO2 97%   BMI 22.55 kg/m²          Physical Exam  General: Alert. No acute distress    Ortho Exam        RIGHT KNEE Flexion 120. Extension 0. Stable to varus/valgus stress. Stable to anterior/posterior drawer. Neurovascularly intact. Negative Berhane. Negative Lachman. Positive EHL, FHL, HS and TA. Sensation intact to light touch all 5 nerves of the foot. Ambulates with Antalgic gait. Patella is well tracking. Calf " supple, non-tender. Negative tenderness to the medial joint line. Positive tenderness to the lateral joint line. Negative Crepitus. Good strength to hamstrings, quadriceps, dorsiflexors, and plantar flexors.  Knee Extensor Mechanism intact     RIGHT ELBOW Tender lateral epicondyle. Non-tender medial epicondyle. Non-tender radial head. Sensation to light touch median, radial, ulnar nerve. Positive AIN, PIN, ulnar nerve motor function. Axillary sensation intact. Elbow ROM 0-140. Negative Tinel's at the elbow. no pain with resisted wrist and long finger extension.       Large Joint Arthrocentesis: R knee  Date/Time: 1/29/2024 3:50 PM  Consent given by: patient  Site marked: site marked  Timeout: Immediately prior to procedure a time out was called to verify the correct patient, procedure, equipment, support staff and site/side marked as required   Supporting Documentation  Indications: pain   Procedure Details  Location: knee - R knee  Needle size: 22 G  Medications administered: 5 mL lidocaine 1 %; 40 mg triamcinolone acetonide 40 MG/ML  Patient tolerance: patient tolerated the procedure well with no immediate complications            Imaging Results (Most Recent)       None             Result Review :         MRI Knee Right Without Contrast    Result Date: 1/17/2024  Narrative: PROCEDURE: MRI KNEE RIGHT  WO CONTRAST  COMPARISON: [facility] TRISTAN BHAKTA, XR KNEE 3 VW RIGHT, 10/19/2023, 15:51.  INDICATIONS: KNEE PAIN, NEGATIVE XRAYS,      TECHNIQUE: A complete multi-planar MRI was performed.   FINDINGS:  There are foci of subchondral edema/cyst formation at the central patella including at the median ridge and central aspects of the medial and lateral patellar facets.  There is also a small focus of subchondral edema at the medial-central aspect of the medial femoral condyle.  There is mild osteophyte formation.  No definite acute fracture.  No significant knee effusion.  Trace fluid is present in a Baker's cyst.   There is edema signal in the quadriceps fat pad.  The quadriceps, patellar, and popliteus tendons appear intact.  The anterior and posterior cruciate ligaments appear intact.  There is thickening of the proximal medial collateral ligament which may be the sequelae of a prior injury.  No definite acute medial or lateral collateral ligament injury is identified.  The iliotibial band appears intact.  No significant medial or lateral meniscus defect is identified.  There are some suspected degenerative signal changes in the medial and lateral meniscus posterior horns.  There is advanced patellofemoral chondromalacia.  There appear to be full-thickness cartilage defects of the central patella with underlying subchondral edema and cyst formation.  There also appears to be high-grade cartilage loss of the central trochlea.  There also appears to be advanced chondromalacia of the weight-bearing medial tibiofemoral articular cartilage.  There is suspected high-grade partial thickness cartilage thinning with probable full-thickness cartilage defects of the central medial femoral condyle where there is a small focus of subchondral edema.  There appears to be mild chondromalacia of the lateral tibiofemoral compartment without definite high-grade cartilage defect.  Possible small intra-articular bodies are seen in the posterior joint recesses.      Impression:   1. Advanced central patellofemoral and medial tibiofemoral chondromalacia with small foci of subchondral edema/cyst formation. 2. No definite acute ligamentous or meniscal injury.  Probable degenerative changes in the posterior medial and lateral meniscus. 3. Edema in the quadriceps fat pad which may be associated with fat pad impingement.     BRUNO GUERRA MD       Electronically Signed and Approved By: BRUNO GUERRA MD on 1/17/2024 at 17:48                     Assessment and Plan     Diagnoses and all orders for this visit:    1. Right lateral epicondylitis  (Primary)    2. Right knee pain, unspecified chronicity    3. Chondromalacia right knee  -     Large Joint Arthrocentesis: R knee        Discussed the treatment plan with the patient. I reviewed the MRI results with the patient.     Discussed the risks and benefits of conservative measures. The patient expressed understanding and wished to proceed with a right knee steroid injection.  She tolerated the injection well.     HEP exercises for lateral epicondylitis.     Call or return if worsening symptoms.    Follow Up     PRN      Patient was given instructions and counseling regarding her condition or for health maintenance advice. Please see specific information pulled into the AVS if appropriate.     Scribed for Gregg Hoffmann MD by Zahida Lal MA.  01/29/24   14:51 EST    I have personally performed the services described in this document as scribed by the above individual and it is both accurate and complete. Gregg Hoffmann MD 01/29/24

## 2024-02-08 ENCOUNTER — TELEPHONE (OUTPATIENT)
Dept: FAMILY MEDICINE CLINIC | Age: 59
End: 2024-02-08
Payer: COMMERCIAL

## 2024-02-08 NOTE — TELEPHONE ENCOUNTER
Caller: Geraldine Candelario    Relationship: Self    Best call back number: [phone]     What was the call regarding: PATIENT STATES SHE NEEDS TO SPEAK WITH LORIN REGARDING THE TIMING OF HER PROLIA INJECTION.

## 2024-02-08 NOTE — TELEPHONE ENCOUNTER
Patient states she is going out of the country 3[place]24 and will return at the end of March. She is concerned that if she gets her injection within the next week or so that she will have side effects occur later on after so she didn't know if she should wait until she returns to get her first injection. I told her that I did not think there would be an issue but I would send to you for your opinion.

## 2024-03-21 ENCOUNTER — TELEPHONE (OUTPATIENT)
Dept: FAMILY MEDICINE CLINIC | Age: 59
End: 2024-03-21
Payer: COMMERCIAL

## 2024-03-21 NOTE — TELEPHONE ENCOUNTER
Caller: Geraldine Candelario    Relationship: Self    Best call back number: [phone]    What medication are you requesting:      What are your current symptoms:     CONGESTION, HEADACHE, SNEEZING, SORE THROAT     How long have you been experiencing symptoms: SINCE 3/17/24     If a prescription is needed, what is your preferred pharmacy and phone   number:       [facility] [phone]     Additional notes:     THE PATIENT SAID SHE TESTED POSITIVE FOR COVID ON HOME TEST  TODAY  AND IS WANTING TO KNOW IF  P WOULD SEND MEDICATION . THE PATIENT SAID SHE RETURNED FROM AN INTERNATIONAL TRIP.    PATIENT WOULD LIKE A CALL TO ADVISE IF THIS WILL BE DONE

## 2024-03-22 ENCOUNTER — TELEPHONE (OUTPATIENT)
Dept: FAMILY MEDICINE CLINIC | Age: 59
End: 2024-03-22
Payer: COMMERCIAL

## 2024-03-22 NOTE — TELEPHONE ENCOUNTER
Caller: Geraldine Candelario    Relationship to patient: Self    Best call back number: [phone]    Patient is needing: PATIENT CALLED IN AND SAID PROLIA SHOT WILL BE DELIVERED BY [facility] ON 3/28/2024 ATTENTION ARIAS CÁRDENAS AND SHE WILL BE COMING TO RECEIVE PROLIA SHOT ON 3/29/2024.

## 2024-03-26 ENCOUNTER — LAB (OUTPATIENT)
Dept: LAB | Facility: HOSPITAL | Age: 59
End: 2024-03-26
Payer: COMMERCIAL

## 2024-03-26 DIAGNOSIS — E78.2 MIXED HYPERLIPIDEMIA: ICD-10-CM

## 2024-03-26 LAB
ALBUMIN SERPL-MCNC: 4.8 G/DL (ref 3.5–5.2)
ALBUMIN/GLOB SERPL: 2 G/DL
ALP SERPL-CCNC: 120 U/L (ref 39–117)
ALT SERPL W P-5'-P-CCNC: 290 U/L (ref 1–33)
ANION GAP SERPL CALCULATED.3IONS-SCNC: 8.1 MMOL/L (ref 5–15)
AST SERPL-CCNC: 123 U/L (ref 1–32)
BILIRUB SERPL-MCNC: 0.9 MG/DL (ref 0–1.2)
BUN SERPL-MCNC: 11 MG/DL (ref 6–20)
BUN/CREAT SERPL: 15.9 (ref 7–25)
CALCIUM SPEC-SCNC: 9.7 MG/DL (ref 8.6–10.5)
CHLORIDE SERPL-SCNC: 103 MMOL/L (ref 98–107)
CHOLEST SERPL-MCNC: 156 MG/DL (ref 0–200)
CO2 SERPL-SCNC: 30.9 MMOL/L (ref 22–29)
CREAT SERPL-MCNC: 0.69 MG/DL (ref 0.57–1)
EGFRCR SERPLBLD CKD-EPI 2021: 102 ML/MIN/1.73
GLOBULIN UR ELPH-MCNC: 2.4 GM/DL
GLUCOSE SERPL-MCNC: 94 MG/DL (ref 65–99)
HDLC SERPL-MCNC: 51 MG/DL (ref 40–60)
LDLC SERPL CALC-MCNC: 87 MG/DL (ref 0–100)
LDLC/HDLC SERPL: 1.67 {RATIO}
POTASSIUM SERPL-SCNC: 4.2 MMOL/L (ref 3.5–5.2)
PROT SERPL-MCNC: 7.2 G/DL (ref 6–8.5)
SODIUM SERPL-SCNC: 142 MMOL/L (ref 136–145)
TRIGL SERPL-MCNC: 98 MG/DL (ref 0–150)
VLDLC SERPL-MCNC: 18 MG/DL (ref 5–40)

## 2024-03-26 PROCEDURE — 80061 LIPID PANEL: CPT

## 2024-03-26 PROCEDURE — 80053 COMPREHEN METABOLIC PANEL: CPT

## 2024-03-26 PROCEDURE — 36415 COLL VENOUS BLD VENIPUNCTURE: CPT

## 2024-03-27 DIAGNOSIS — R74.8 ELEVATED LIVER ENZYMES: Primary | ICD-10-CM

## 2024-03-27 NOTE — PROGRESS NOTES
Please call patient.  Cholesterol is dramatically improved since starting atorvastatin in January.  Her liver enzymes have increased however.  This could be due to atorvastatin or nsaid such as diclofenac.  Is she having nausea,vomiting, abdominal pain? Established with GI (dr connolly/ yoni wagner) with next appointment July 5.  I recommend that she stop atorvastatin and get ultrasound of abdomen if she is agreeable.  We will need to repeat labs in one month .  If agreeable I will enter orders.

## 2024-03-29 ENCOUNTER — CLINICAL SUPPORT (OUTPATIENT)
Dept: FAMILY MEDICINE CLINIC | Age: 59
End: 2024-03-29
Payer: COMMERCIAL

## 2024-03-29 DIAGNOSIS — M81.0 OSTEOPOROSIS WITHOUT CURRENT PATHOLOGICAL FRACTURE, UNSPECIFIED OSTEOPOROSIS TYPE: Primary | ICD-10-CM

## 2024-04-05 ENCOUNTER — TELEPHONE (OUTPATIENT)
Dept: GASTROENTEROLOGY | Facility: CLINIC | Age: 59
End: 2024-04-05
Payer: COMMERCIAL

## 2024-04-05 NOTE — TELEPHONE ENCOUNTER
Artera message sent to notify patient that ARIAS Molina will be out of the office Friday 7/5/2024.Advised patient to contact our office  to reschedule appointment.

## 2024-04-23 ENCOUNTER — HOSPITAL ENCOUNTER (OUTPATIENT)
Dept: ULTRASOUND IMAGING | Facility: HOSPITAL | Age: 59
Discharge: HOME | End: 2024-04-23
Payer: COMMERCIAL

## 2024-04-23 ENCOUNTER — LAB (OUTPATIENT)
Dept: LAB | Facility: HOSPITAL | Age: 59
End: 2024-04-23
Payer: COMMERCIAL

## 2024-04-23 DIAGNOSIS — R74.8 ELEVATED LIVER ENZYMES: ICD-10-CM

## 2024-04-23 LAB
ALBUMIN SERPL-MCNC: 4.5 G/DL (ref 3.5–5.2)
ALBUMIN/GLOB SERPL: 2.1 G/DL
ALP SERPL-CCNC: 100 U/L (ref 39–117)
ALT SERPL W P-5'-P-CCNC: 35 U/L (ref 1–33)
ANION GAP SERPL CALCULATED.3IONS-SCNC: 8.4 MMOL/L (ref 5–15)
AST SERPL-CCNC: 28 U/L (ref 1–32)
BILIRUB SERPL-MCNC: 0.8 MG/DL (ref 0–1.2)
BUN SERPL-MCNC: 7 MG/DL (ref 6–20)
BUN/CREAT SERPL: 12.5 (ref 7–25)
CALCIUM SPEC-SCNC: 8.8 MG/DL (ref 8.6–10.5)
CHLORIDE SERPL-SCNC: 108 MMOL/L (ref 98–107)
CHOLEST SERPL-MCNC: 245 MG/DL (ref 0–200)
CO2 SERPL-SCNC: 26.6 MMOL/L (ref 22–29)
CREAT SERPL-MCNC: 0.56 MG/DL (ref 0.57–1)
EGFRCR SERPLBLD CKD-EPI 2021: 107.3 ML/MIN/1.73
GLOBULIN UR ELPH-MCNC: 2.1 GM/DL
GLUCOSE SERPL-MCNC: 98 MG/DL (ref 65–99)
HAV IGM SERPL QL IA: NORMAL
HBV CORE IGM SERPL QL IA: NORMAL
HBV SURFACE AG SERPL QL IA: NORMAL
HCV AB SER QL: NORMAL
HDLC SERPL-MCNC: 60 MG/DL (ref 40–60)
LDLC SERPL CALC-MCNC: 158 MG/DL (ref 0–100)
LDLC/HDLC SERPL: 2.57 {RATIO}
POTASSIUM SERPL-SCNC: 4.1 MMOL/L (ref 3.5–5.2)
PROT SERPL-MCNC: 6.6 G/DL (ref 6–8.5)
SODIUM SERPL-SCNC: 143 MMOL/L (ref 136–145)
TRIGL SERPL-MCNC: 153 MG/DL (ref 0–150)
VLDLC SERPL-MCNC: 27 MG/DL (ref 5–40)

## 2024-04-23 PROCEDURE — 80053 COMPREHEN METABOLIC PANEL: CPT

## 2024-04-23 PROCEDURE — 36415 COLL VENOUS BLD VENIPUNCTURE: CPT

## 2024-04-23 PROCEDURE — 80074 ACUTE HEPATITIS PANEL: CPT

## 2024-04-23 PROCEDURE — 76700 US EXAM ABDOM COMPLETE: CPT

## 2024-04-23 PROCEDURE — 80061 LIPID PANEL: CPT

## 2024-04-24 NOTE — PROGRESS NOTES
Negative for hepatitis.  Liver function dramatically improved.  Kidney function is normal.  Cholesterol levels have increased again however.  Consider starting a nonstatin cholesterol-lowering medication such as Zetia to take while you are waiting to be evaluated by gastroenterology in July.  Zetia may not lower levels as significantly as atorvastatin but I would like the opinion of gastroenterology that it would be okay to restart a statin medication with her.

## 2024-04-24 NOTE — PROGRESS NOTES
Your ultrasound of the abdomen is normal including the liver.  There is minimal if any fatty liver infiltration.  There also is possibly some trace sludge in the gallbladder which is otherwise normal in appearance.  This is of no concern unless you are consistently noting some right upper side pain that worsens with eating.  That could be symptoms of gallbladder dysfunction and then I would refer you to a surgeon for further evaluation.  Your liver enzymes have improved significantly which is reassuring.

## 2024-04-25 ENCOUNTER — PATIENT MESSAGE (OUTPATIENT)
Dept: GASTROENTEROLOGY | Facility: CLINIC | Age: 59
End: 2024-04-25
Payer: COMMERCIAL

## 2024-04-25 DIAGNOSIS — E78.2 MIXED HYPERLIPIDEMIA: Primary | ICD-10-CM

## 2024-04-25 RX ORDER — EZETIMIBE 10 MG/1
10 TABLET ORAL DAILY
Qty: 30 TABLET | Refills: 2 | Status: SHIPPED | OUTPATIENT
Start: 2024-04-25

## 2024-04-29 ENCOUNTER — TELEPHONE (OUTPATIENT)
Dept: GASTROENTEROLOGY | Facility: CLINIC | Age: 59
End: 2024-04-29
Payer: COMMERCIAL

## 2024-04-29 NOTE — TELEPHONE ENCOUNTER
----- Message from Brianna ZHAO sent at 4/25/2024  1:17 PM EDT -----  Regarding: omeprazole  Contact: [phone]  Chapin Abarca,    I have not been taking omeprazole regularly. I recently took the Prolia injection for osteoperosis and my family doc suggested asking you about the dosage to make sure those two drugs work fine together .  In the mean time I have been experiencing heartburn and wonder if I should resume omeprazole or take OTC Pepcid or other ?  I am not scheduled to see you until July .  Just let me know if you need me to come in sooner . Thank you .

## 2024-04-30 RX ORDER — FAMOTIDINE 40 MG/1
40 TABLET, FILM COATED ORAL
Qty: 90 TABLET | Refills: 1 | Status: SHIPPED | OUTPATIENT
Start: 2024-04-30

## 2024-04-30 NOTE — TELEPHONE ENCOUNTER
Pepcid 40mg sent to pharmacy for patient to take daily. If heartburn symptoms persist may need to escalate to Prilosec daily.

## 2024-06-17 ENCOUNTER — TELEPHONE (OUTPATIENT)
Dept: FAMILY MEDICINE CLINIC | Age: 59
End: 2024-06-17
Payer: COMMERCIAL

## 2024-06-17 NOTE — TELEPHONE ENCOUNTER
Pt due for Prolia 9/29/24    Last Dexa -3.4  DEXA Bone Density Axial (12/04/2023 11:29)     Last CMP- Calcium 8.8  Comprehensive metabolic panel (04/23/2024 09:07)     Last office Visit   Office Visit with Diana Kan APRN (01/18/2024)   
Detail Level: Detailed
Size Of Lesion: 7 mm

## 2024-07-16 ENCOUNTER — LAB (OUTPATIENT)
Dept: LAB | Facility: HOSPITAL | Age: 59
End: 2024-07-16
Payer: COMMERCIAL

## 2024-07-16 DIAGNOSIS — E78.2 MIXED HYPERLIPIDEMIA: ICD-10-CM

## 2024-07-16 LAB
ALBUMIN SERPL-MCNC: 4.8 G/DL (ref 3.5–5.2)
ALBUMIN/GLOB SERPL: 2 G/DL
ALP SERPL-CCNC: 78 U/L (ref 39–117)
ALT SERPL W P-5'-P-CCNC: 23 U/L (ref 1–33)
ANION GAP SERPL CALCULATED.3IONS-SCNC: 11.7 MMOL/L (ref 5–15)
AST SERPL-CCNC: 25 U/L (ref 1–32)
BILIRUB SERPL-MCNC: 1.1 MG/DL (ref 0–1.2)
BUN SERPL-MCNC: 8 MG/DL (ref 6–20)
BUN/CREAT SERPL: 14.3 (ref 7–25)
CALCIUM SPEC-SCNC: 9.6 MG/DL (ref 8.6–10.5)
CHLORIDE SERPL-SCNC: 104 MMOL/L (ref 98–107)
CHOLEST SERPL-MCNC: 219 MG/DL (ref 0–200)
CO2 SERPL-SCNC: 26.3 MMOL/L (ref 22–29)
CREAT SERPL-MCNC: 0.56 MG/DL (ref 0.57–1)
EGFRCR SERPLBLD CKD-EPI 2021: 107.3 ML/MIN/1.73
GLOBULIN UR ELPH-MCNC: 2.4 GM/DL
GLUCOSE SERPL-MCNC: 109 MG/DL (ref 65–99)
HDLC SERPL-MCNC: 67 MG/DL (ref 40–60)
LDLC SERPL CALC-MCNC: 138 MG/DL (ref 0–100)
LDLC/HDLC SERPL: 2.02 {RATIO}
POTASSIUM SERPL-SCNC: 4 MMOL/L (ref 3.5–5.2)
PROT SERPL-MCNC: 7.2 G/DL (ref 6–8.5)
SODIUM SERPL-SCNC: 142 MMOL/L (ref 136–145)
TRIGL SERPL-MCNC: 82 MG/DL (ref 0–150)
VLDLC SERPL-MCNC: 14 MG/DL (ref 5–40)

## 2024-07-16 PROCEDURE — 80053 COMPREHEN METABOLIC PANEL: CPT

## 2024-07-16 PROCEDURE — 36415 COLL VENOUS BLD VENIPUNCTURE: CPT

## 2024-07-16 PROCEDURE — 80061 LIPID PANEL: CPT

## 2024-07-18 ENCOUNTER — OFFICE VISIT (OUTPATIENT)
Dept: GASTROENTEROLOGY | Facility: CLINIC | Age: 59
End: 2024-07-18
Payer: COMMERCIAL

## 2024-07-18 VITALS
SYSTOLIC BLOOD PRESSURE: 116 MMHG | WEIGHT: 149.4 LBS | DIASTOLIC BLOOD PRESSURE: 74 MMHG | BODY MASS INDEX: 23.45 KG/M2 | HEART RATE: 77 BPM | OXYGEN SATURATION: 98 % | HEIGHT: 67 IN

## 2024-07-18 DIAGNOSIS — K63.5 POLYP OF COLON, UNSPECIFIED PART OF COLON, UNSPECIFIED TYPE: Primary | ICD-10-CM

## 2024-07-18 DIAGNOSIS — K20.0 EOSINOPHILIC ESOPHAGITIS: ICD-10-CM

## 2024-07-18 DIAGNOSIS — Z80.0 FAMILY HISTORY OF COLON CANCER: ICD-10-CM

## 2024-07-18 DIAGNOSIS — K21.00 GASTROESOPHAGEAL REFLUX DISEASE WITH ESOPHAGITIS WITHOUT HEMORRHAGE: ICD-10-CM

## 2024-07-18 RX ORDER — FAMOTIDINE 40 MG/1
40 TABLET, FILM COATED ORAL
Qty: 90 TABLET | Refills: 1 | Status: SHIPPED | OUTPATIENT
Start: 2024-07-18

## 2024-07-18 NOTE — PATIENT INSTRUCTIONS
Gastroesophageal Reflux Disease, Adult  Gastroesophageal reflux (ANTOINE) happens when acid from the stomach flows up into the tube that connects the mouth and the stomach (esophagus). Normally, food travels down the esophagus and stays in the stomach to be digested. However, when a person has ANTOINE, food and stomach acid sometimes move back up into the esophagus. If this becomes a more serious problem, the person may be diagnosed with a disease called gastroesophageal reflux disease (GERD). GERD occurs when the reflux:  Happens often.  Causes frequent or severe symptoms.  Causes problems such as damage to the esophagus.  When stomach acid comes in contact with the esophagus, the acid may cause inflammation in the esophagus. Over time, GERD may create small holes (ulcers) in the lining of the esophagus.  What are the causes?  This condition is caused by a problem with the muscle between the esophagus and the stomach (lower esophageal sphincter, or LES). Normally, the LES muscle closes after food passes through the esophagus to the stomach. When the LES is weakened or abnormal, it does not close properly, and that allows food and stomach acid to go back up into the esophagus.  The LES can be weakened by certain dietary substances, medicines, and medical conditions, including:  Tobacco use.  Pregnancy.  Having a hiatal hernia.  Alcohol use.  Certain foods and beverages, such as coffee, chocolate, onions, and peppermint.  What increases the risk?  You are more likely to develop this condition if you:  Have an increased body weight.  Have a connective tissue disorder.  Take NSAIDs, such as ibuprofen.  What are the signs or symptoms?  Symptoms of this condition include:  Heartburn.  Difficult or painful swallowing and the feeling of having a lump in the throat.  A bitter taste in the mouth.  Bad breath and having a large amount of saliva.  Having an upset or bloated stomach and belching.  Chest pain. Different conditions can  cause chest pain. Make sure you see your health care provider if you experience chest pain.  Shortness of breath or wheezing.  Ongoing (chronic) cough or a nighttime cough.  Wearing away of tooth enamel.  Weight loss.  How is this diagnosed?  This condition may be diagnosed based on a medical history and a physical exam. To determine if you have mild or severe GERD, your health care provider may also monitor how you respond to treatment. You may also have tests, including:  A test to examine your stomach and esophagus with a small camera (endoscopy).  A test that measures the acidity level in your esophagus.  A test that measures how much pressure is on your esophagus.  A barium swallow or modified barium swallow test to show the shape, size, and functioning of your esophagus.  How is this treated?  Treatment for this condition may vary depending on how severe your symptoms are. Your health care provider may recommend:  Changes to your diet.  Medicine.  Surgery.  The goal of treatment is to help relieve your symptoms and to prevent complications.  Follow these instructions at home:  Eating and drinking    Follow a diet as recommended by your health care provider. This may involve avoiding foods and drinks such as:  Coffee and tea, with or without caffeine.  Drinks that contain alcohol.  Energy drinks and sports drinks.  Carbonated drinks or sodas.  Chocolate and cocoa.  Peppermint and mint flavorings.  Garlic and onions.  Horseradish.  Spicy and acidic foods, including peppers, chili powder, jernigan powder, vinegar, hot sauces, and barbecue sauce.  Citrus fruit juices and citrus fruits, such as oranges, lesli, and limes.  Tomato-based foods, such as red sauce, chili, salsa, and pizza with red sauce.  Fried and fatty foods, such as donuts, french fries, potato chips, and high-fat dressings.  High-fat meats, such as hot dogs and fatty cuts of red and white meats, such as rib eye steak, sausage, ham, and  martinez.  High-fat dairy items, such as whole milk, butter, and cream cheese.  Eat small, frequent meals instead of large meals.  Avoid drinking large amounts of liquid with your meals.  Avoid eating meals during the 2-3 hours before bedtime.  Avoid lying down right after you eat.  Do not exercise right after you eat.  Lifestyle    Do not use any products that contain nicotine or tobacco. These products include cigarettes, chewing tobacco, and vaping devices, such as e-cigarettes. If you need help quitting, ask your health care provider.  Try to reduce your stress by using methods such as yoga or meditation. If you need help reducing stress, ask your health care provider.  If you are overweight, reduce your weight to an amount that is healthy for you. Ask your health care provider for guidance about a safe weight loss goal.  General instructions  Pay attention to any changes in your symptoms.  Take over-the-counter and prescription medicines only as told by your health care provider. Do not take aspirin, ibuprofen, or other NSAIDs unless your health care provider told you to take these medicines.  Wear loose-fitting clothing. Do not wear anything tight around your waist that causes pressure on your abdomen.  Raise (elevate) the head of your bed about 6 inches (15 cm). You can use a wedge to do this.  Avoid bending over if this makes your symptoms worse.  Keep all follow-up visits. This is important.  Contact a health care provider if:  You have:  New symptoms.  Unexplained weight loss.  Difficulty swallowing or it hurts to swallow.  Wheezing or a persistent cough.  A hoarse voice.  Your symptoms do not improve with treatment.  Get help right away if:  You have sudden pain in your arms, neck, jaw, teeth, or back.  You suddenly feel sweaty, dizzy, or light-headed.  You have chest pain or shortness of breath.  You vomit and the vomit is green, yellow, or black, or it looks like blood or coffee grounds.  You faint.  You  have stool that is red, bloody, or black.  You cannot swallow, drink, or eat.  These symptoms may represent a serious problem that is an emergency. Do not wait to see if the symptoms will go away. Get medical help right away. Call your local emergency services (911 in the [place] Do not drive yourself to the hospital.  Summary  Gastroesophageal reflux happens when acid from the stomach flows up into the esophagus. GERD is a disease in which the reflux happens often, causes frequent or severe symptoms, or causes problems such as damage to the esophagus.  Treatment for this condition may vary depending on how severe your symptoms are. Your health care provider may recommend diet and lifestyle changes, medicine, or surgery.  Contact a health care provider if you have new or worsening symptoms.  Take over-the-counter and prescription medicines only as told by your health care provider. Do not take aspirin, ibuprofen, or other NSAIDs unless your health care provider told you to do so.  Keep all follow-up visits as told by your health care provider. This is important.  This information is not intended to replace advice given to you by your health care provider. Make sure you discuss any questions you have with your health care provider.  Document Revised: 06/28/2021 Document Reviewed: 06/28/2021  [employer] Education © 2024 [employer]

## 2024-07-18 NOTE — PROGRESS NOTES
Chief Complaint   Med Refill and 1 yr follow up    History of Present Illness       Geraldine Candelario is a 58 y.o. female who presents to [facility] for 1 year follow-up, patient has a history of dysphagia, eosinophilic esophagitis, colon polyps, family history of colon cancer and allergies.  It was noted on EGD that the patient had EOE.  Patient previously underwent esophageal dilation which improved dysphagia.  Patient was diagnosed with osteoporosis and has been started on Prolia.  Patient's medication was transition from Prilosec to Pepcid and her reflux continues to be well-controlled.  Patient did have a moment after traveling to [place] and having COVID of elevated liver enzymes which have now returned to normal.  Patient's cholesterol has improved.  We have reviewed labs in office.  Patient denies fever, nausea, vomiting, weight loss, night sweats, melena, hematochezia, hematemesis.    Most recent labs- 4/23/24  US Abdomen Complete- 3/27/24  Minimal if any hepatic steatosis suspected. Liver is normal in size.  There is no cholelithiasis or cholecystitis. Question trace sludge in  the gallbladder.       Endoscopy: Review of the patient's most recent EGD and colonoscopy performed by Dr. Hallman on 02/07/2022 mucosal changes including ringed esophagus and longitudinal furrows were found in the entire esophagus.  Eosinophilic esophagitis suspected.  1 benign-appearing intrinsic stenosis at the GE junction dilated with Madrigal 50 Albanian.  Mild erythema of the stomach.  Duodenum normal.  Medium size hiatal hernia.  Patient placed on Prilosec for 3 months.  Normal colonoscopy.  Repeat colonoscopy 3 years, 2025.  Pathology with mild inactive gastritis.  Midesophagus biopsy consistent with eosinophilic esophagitis  Results       Result Review :   The following data was reviewed by: ARIAS Molina on 07/18/2024:    CMP          3/26/2024    10:14 4/23/2024    09:07 7/16/2024    08:25  "  CMP   Glucose 94  98  109    BUN 11  7  8    Creatinine 0.69  0.56  0.56    EGFR 102.0  107.3  107.3    Sodium 142  143  142    Potassium 4.2  4.1  4.0    Chloride 103  108  104    Calcium 9.7  8.8  9.6    Total Protein 7.2  6.6  7.2    Albumin 4.8  4.5  4.8    Globulin 2.4  2.1  2.4    Total Bilirubin 0.9  0.8  1.1    Alkaline Phosphatase 120  100  78    AST (SGOT) 123  28  25    ALT (SGPT) 290  35  23    Albumin/Globulin Ratio 2.0  2.1  2.0    BUN/Creatinine Ratio 15.9  12.5  14.3    Anion Gap 8.1  8.4  11.7          Iron Profile No results found for: \"IRON\", \"TIBC\", \"LABIRON\", \"TRANSFERRIN\"  Ferritin No results found for: \"FERRITIN\"            Past Medical History       Past Medical History:   Diagnosis Date    Colon polyps     Esophageal dilatation 2012    Esophageal stricture 2012    GERD (gastroesophageal reflux disease)     Rectal bleeding        Past Surgical History:   Procedure Laterality Date    ABDOMINAL SURGERY      COLONOSCOPY      2016 & 2017    COLONOSCOPY N/A 10/11/2021    Procedure: COLONOSCOPY WITH ORISE INJECTION, POLYPECTOMY, CAUTERY, CLIP APPLICATION X6;  Surgeon: Xander Hallman MD;  Location: [facility] ENDOSCOPY;  Service: Gastroenterology;  Laterality: N/A;  DIVERTICULOSIS, COLON POLYP    COLONOSCOPY N/A 02/07/2022    Procedure: COLONOSCOPY;  Surgeon: Xander Hallman MD;  Location: [facility] ENDOSCOPY;  Service: Gastroenterology;  Laterality: N/A;  NORMAL COLONOSCOPY    ENDOSCOPY      2010 & 2005    ENDOSCOPY N/A 02/07/2022    Procedure: ESOPHAGOGASTRODUODENOSCOPY WITH BIOPSIES, DILATION WITH 50FR CALL;  Surgeon: Xander Hallman MD;  Location: [facility] ENDOSCOPY;  Service: Gastroenterology;  Laterality: N/A;  EOSINOPHYLLIC ESOPHAGITIS, ESOPHAGEAL STRICTURE, GASTRITIS    TONSILLECTOMY      TOOTH EXTRACTION      TUBAL ABDOMINAL LIGATION      UPPER GASTROINTESTINAL ENDOSCOPY           Current Outpatient Medications:     cetirizine (zyrTEC) 10 MG tablet, Take 1 tablet by mouth " "Daily., Disp: , Rfl:     denosumab (PROLIA) 60 MG/ML solution prefilled syringe syringe, Inject 1 mL under the skin into the appropriate area as directed Every 6 (Six) Months. Indications: Postmenopausal Osteoporosis, Disp: 1 mL, Rfl: 0    ezetimibe (Zetia) 10 MG tablet, Take 1 tablet by mouth Daily., Disp: 30 tablet, Rfl: 2    famotidine (Pepcid) 40 MG tablet, Take 1 tablet by mouth every night at bedtime., Disp: 90 tablet, Rfl: 1    fluticasone (FLONASE) 50 MCG/ACT nasal spray, 2 sprays into the nostril(s) as directed by provider Daily., Disp: , Rfl:     hydrOXYzine (ATARAX) 25 MG tablet, Take 1 tablet by mouth 3 (Three) Times a Day As Needed for Itching (insomnia)., Disp: 90 tablet, Rfl: 1    metroNIDAZOLE (METROGEL) 0.75 % gel, APPLY TOPICALLY TO FACE TWICE DAILY AS NEEDED, Disp: , Rfl:     ondansetron (Zofran) 4 MG tablet, Take 1 tablet by mouth Every 8 (Eight) Hours As Needed for Nausea or Vomiting., Disp: 30 tablet, Rfl: 0    Scopolamine 1 MG/3DAYS patch, Place 1 patch on the skin as directed by provider Every 72 (Seventy-Two) Hours., Disp: 4 patch, Rfl: 1    diclofenac (VOLTAREN) 50 MG EC tablet, Take 1 tablet by mouth 2 (Two) Times a Day As Needed (Right knee pain). (Patient not taking: Reported on 7/18/2024), Disp: 80 tablet, Rfl: 0     Allergies   Allergen Reactions    Latex Unknown - Low Severity     ALLERGY TESTING ONLY NEVER HAS HAD A REACTION       Family History   Problem Relation Age of Onset    Colon cancer Mother 58    Cancer Mother     Cancer Sister     Colon cancer Sister 64    Malig Hyperthermia Neg Hx         Social History     Social History Narrative    Not on file       Objective     Vital Signs:   /74 (BP Location: Right arm, Patient Position: Sitting, Cuff Size: Adult)   Pulse 77   Ht 170.2 cm (67.01\")   Wt 67.8 kg (149 lb 6.4 oz)   SpO2 98%   BMI 23.39 kg/m²       Physical Exam  Constitutional:       Appearance: Normal appearance.   Pulmonary:      Effort: Pulmonary effort is " normal.   Neurological:      General: No focal deficit present.      Mental Status: She is alert and oriented to person, place, and time.   Psychiatric:         Mood and Affect: Mood normal.         Behavior: Behavior normal.           Assessment & Plan          Assessment and Plan    Diagnoses and all orders for this visit:    1. Polyp of colon, unspecified part of colon, unspecified type (Primary)    2. Eosinophilic esophagitis    3. Family history of colon cancer    4. Gastroesophageal reflux disease with esophagitis without hemorrhage    Other orders  -     famotidine (Pepcid) 40 MG tablet; Take 1 tablet by mouth every night at bedtime.  Dispense: 90 tablet; Refill: 1      58-year-old female presenting to the office today for 1 year follow-up, patient has a history of dysphagia, eosinophilic esophagitis, colon polyps, family history of colon cancer and allergies.  It was noted on EGD that the patient had EOE.  Patient previously underwent esophageal dilation which improved dysphagia.  Patient was diagnosed with osteoporosis and has been started on Prolia.  Patient's medication was transition from Prilosec to Pepcid and her reflux continues to be well-controlled.  Patient will continue Pepcid as prescribed.  Patient be due for repeat colonoscopy in February.  Patient placed on nursing MA phone call to schedule to schedule colonoscopy for February.  Patient will follow-up after endoscopy.          Follow Up       Follow Up   Return for Follow up after endoscopy in office.  Patient was given instructions and counseling regarding her condition or for health maintenance advice. Please see specific information pulled into the AVS if appropriate.

## 2024-07-18 NOTE — PROGRESS NOTES
Cholesterol has improved.  Blood sugar is slightly elevated.  Decrease intake of sugar.  Kidney and liver function are normal.  We will discuss further at your upcoming appointment.

## 2024-07-23 DIAGNOSIS — G47.00 INSOMNIA, UNSPECIFIED TYPE: ICD-10-CM

## 2024-07-25 RX ORDER — HYDROXYZINE HYDROCHLORIDE 25 MG/1
TABLET, FILM COATED ORAL
Qty: 90 TABLET | Refills: 0 | Status: SHIPPED | OUTPATIENT
Start: 2024-07-25

## 2024-08-01 ENCOUNTER — OFFICE VISIT (OUTPATIENT)
Dept: FAMILY MEDICINE CLINIC | Age: 59
End: 2024-08-01
Payer: COMMERCIAL

## 2024-08-01 VITALS
WEIGHT: 152.2 LBS | DIASTOLIC BLOOD PRESSURE: 73 MMHG | TEMPERATURE: 98.2 F | HEIGHT: 67 IN | HEART RATE: 83 BPM | OXYGEN SATURATION: 96 % | SYSTOLIC BLOOD PRESSURE: 113 MMHG | BODY MASS INDEX: 23.89 KG/M2

## 2024-08-01 DIAGNOSIS — Z12.31 ENCOUNTER FOR SCREENING MAMMOGRAM FOR MALIGNANT NEOPLASM OF BREAST: Primary | ICD-10-CM

## 2024-08-01 DIAGNOSIS — M25.461 PAIN AND SWELLING OF RIGHT KNEE: ICD-10-CM

## 2024-08-01 DIAGNOSIS — M25.561 PAIN AND SWELLING OF RIGHT KNEE: ICD-10-CM

## 2024-08-01 DIAGNOSIS — E78.2 MIXED HYPERLIPIDEMIA: ICD-10-CM

## 2024-08-01 PROCEDURE — 99214 OFFICE O/P EST MOD 30 MIN: CPT | Performed by: NURSE PRACTITIONER

## 2024-08-01 RX ORDER — EZETIMIBE 10 MG/1
10 TABLET ORAL DAILY
Qty: 30 TABLET | Refills: 2 | Status: SHIPPED | OUTPATIENT
Start: 2024-08-01

## 2024-08-01 NOTE — PROGRESS NOTES
Chief Complaint  Hyperlipidemia (6 months follow up )    Subjective          Geraldine Candelario presents to [facility]     Patient is 58-year-old female here today to follow-up regarding hyperlipidemia.  She has been on atorvastatin 10 mg daily prior to noticing some elevated liver enzymes earlier this year.  At that time atorvastatin was discontinued and Zetia 10 mg daily was started.  Patient was uncertain if starting Prolia in March may have been a contributor to elevated liver enzymes.  Patient was not having any notable GI upset or gastrointestinal related symptoms.  She did see gastroenterology and liver enzymes have returned to normal.  Gastroenterologist feels it would be okay to reintroduce atorvastatin.  Being that cholesterol levels have improved, patient reports she did stop taking Zetia for a few weeks in June due to having some diarrhea which is now resolved.  Patient feels like lipid panel would look even better if she consistently taking Zetia throughout those months.  She denies medication side effects of Zetia requests refills.  Prefers not to restart atorvastatin at this time.    Received a steroid injection in her right knee per orthopedics.  Has been reluctant to take Tylenol or ibuprofen since the previous bout of elevated liver enzymes.  She has been advised that she could restart taking diclofenac as needed for knee pain.  Wearing a knee brace seem to aggravate symptoms more.  She can follow-up with Dr. Jules for another injection if needed.    Last Pap smear negative in 2020.  Patient estimates she started menopause in 2020.  Last mammogram was in 2021.  She would like to get updated mammogram screening.    Will get next dose of Prolia in September.  She will notify office if she feels as if she is having any side effects of concern after that dose.  We will plan to check a bone density test sometime next year and follow-up.     Objective   Vital Signs:  "  Vitals:    08/01/24 1253   BP: 113/73   BP Location: Right arm   Patient Position: Sitting   Pulse: 83   Temp: 98.2 °F (36.8 °C)   TempSrc: Oral   SpO2: 96%   Weight: 69 kg (152 lb 3.2 oz)   Height: 170.2 cm (67.01\")       Wt Readings from Last 3 Encounters:   08/01/24 69 kg (152 lb 3.2 oz)   07/18/24 67.8 kg (149 lb 6.4 oz)   01/29/24 65.3 kg (144 lb)      BP Readings from Last 3 Encounters:   08/01/24 113/73   07/18/24 116/74   01/29/24 115/80       Body mass index is 23.83 kg/m².    BMI is within normal parameters. No other follow-up for BMI required.       Physical Exam  Vitals reviewed.   Constitutional:       General: She is not in acute distress.     Appearance: Normal appearance. She is well-developed.   Neck:      Thyroid: No thyromegaly.   Cardiovascular:      Rate and Rhythm: Normal rate and regular rhythm.      Heart sounds: Normal heart sounds.   Pulmonary:      Effort: Pulmonary effort is normal.      Breath sounds: Normal breath sounds.   Musculoskeletal:      Right lower leg: No edema.      Left lower leg: No edema.   Skin:     General: Skin is warm and dry.   Neurological:      General: No focal deficit present.      Mental Status: She is alert.   Psychiatric:         Attention and Perception: Attention normal.         Mood and Affect: Mood and affect normal.         Behavior: Behavior normal.           Current Outpatient Medications:     cetirizine (zyrTEC) 10 MG tablet, Take 1 tablet by mouth Daily., Disp: , Rfl:     denosumab (PROLIA) 60 MG/ML solution prefilled syringe syringe, Inject 1 mL under the skin into the appropriate area as directed Every 6 (Six) Months. Indications: Postmenopausal Osteoporosis, Disp: 1 mL, Rfl: 0    diclofenac (VOLTAREN) 50 MG EC tablet, Take 1 tablet by mouth 2 (Two) Times a Day As Needed (Right knee pain)., Disp: 60 tablet, Rfl: 2    ezetimibe (Zetia) 10 MG tablet, Take 1 tablet by mouth Daily., Disp: 30 tablet, Rfl: 2    famotidine (Pepcid) 40 MG tablet, Take 1 " tablet by mouth every night at bedtime., Disp: 90 tablet, Rfl: 1    fluticasone (FLONASE) 50 MCG/ACT nasal spray, 2 sprays into the nostril(s) as directed by provider Daily., Disp: , Rfl:     hydrOXYzine (ATARAX) 25 MG tablet, TAKE 1 TABLET BY MOUTH THREE TIMES DAILY AS NEEDED FOR ITCHING (INSOMNIA), Disp: 90 tablet, Rfl: 0    metroNIDAZOLE (METROGEL) 0.75 % gel, APPLY TOPICALLY TO FACE TWICE DAILY AS NEEDED, Disp: , Rfl:     ondansetron (Zofran) 4 MG tablet, Take 1 tablet by mouth Every 8 (Eight) Hours As Needed for Nausea or Vomiting., Disp: 30 tablet, Rfl: 0    Scopolamine 1 MG/3DAYS patch, Place 1 patch on the skin as directed by provider Every 72 (Seventy-Two) Hours., Disp: 4 patch, Rfl: 1   Past Medical History:   Diagnosis Date    Colon polyps     Esophageal dilatation 2012    Esophageal stricture 2012    GERD (gastroesophageal reflux disease)     Rectal bleeding      Allergies   Allergen Reactions    Latex Unknown - Low Severity     ALLERGY TESTING ONLY NEVER HAS HAD A REACTION               Result Review :     Common labs          3/26/2024    10:14 4/23/2024    09:07 7/16/2024    08:25   Common Labs   Glucose 94  98  109    BUN 11  7  8    Creatinine 0.69  0.56  0.56    Sodium 142  143  142    Potassium 4.2  4.1  4.0    Chloride 103  108  104    Calcium 9.7  8.8  9.6    Albumin 4.8  4.5  4.8    Total Bilirubin 0.9  0.8  1.1    Alkaline Phosphatase 120  100  78    AST (SGOT) 123  28  25    ALT (SGPT) 290  35  23    Total Cholesterol 156  245  219    Triglycerides 98  153  82    HDL Cholesterol 51  60  67    LDL Cholesterol  87  158  138         US Abdomen Complete    Result Date: 4/23/2024  Impression: Minimal if any hepatic steatosis suspected. Liver is normal in size.  There is no cholelithiasis or cholecystitis. Question trace sludge in the gallbladder.    Electronically Signed By-CAMPBELL RAMIREZ MD On:4/23/2024 5:36 PM               Social History     Tobacco Use   Smoking Status Never    Passive  exposure: Past   Smokeless Tobacco Never           Assessment and Plan    Diagnoses and all orders for this visit:    1. Encounter for screening mammogram for malignant neoplasm of breast (Primary)  -     Mammo Screening Digital Tomosynthesis Bilateral With CAD; Future    2. Mixed hyperlipidemia  -     ezetimibe (Zetia) 10 MG tablet; Take 1 tablet by mouth Daily.  Dispense: 30 tablet; Refill: 2    3. Pain and swelling of right knee  Comments:  Rest, ice, elevation and compression, ortho referral for possible MRI and further treatment plan  Orders:  -     diclofenac (VOLTAREN) 50 MG EC tablet; Take 1 tablet by mouth 2 (Two) Times a Day As Needed (Right knee pain).  Dispense: 60 tablet; Refill: 2        Follow Up    No follow-ups on file.  Patient was given instructions and counseling regarding her condition or for health maintenance advice. Please see specific information pulled into the AVS if appropriate.

## 2024-09-03 ENCOUNTER — HOSPITAL ENCOUNTER (OUTPATIENT)
Dept: MAMMOGRAPHY | Facility: HOSPITAL | Age: 59
Discharge: HOME | End: 2024-09-03
Admitting: NURSE PRACTITIONER
Payer: COMMERCIAL

## 2024-09-03 DIAGNOSIS — Z12.31 ENCOUNTER FOR SCREENING MAMMOGRAM FOR MALIGNANT NEOPLASM OF BREAST: ICD-10-CM

## 2024-09-03 PROCEDURE — 77067 SCR MAMMO BI INCL CAD: CPT

## 2024-09-03 PROCEDURE — 77063 BREAST TOMOSYNTHESIS BI: CPT

## 2024-09-05 ENCOUNTER — OFFICE VISIT (OUTPATIENT)
Dept: FAMILY MEDICINE CLINIC | Age: 59
End: 2024-09-05
Payer: COMMERCIAL

## 2024-09-05 ENCOUNTER — HOSPITAL ENCOUNTER (OUTPATIENT)
Dept: GENERAL RADIOLOGY | Facility: HOSPITAL | Age: 59
Discharge: HOME | End: 2024-09-05
Admitting: NURSE PRACTITIONER
Payer: COMMERCIAL

## 2024-09-05 VITALS
OXYGEN SATURATION: 95 % | HEART RATE: 94 BPM | DIASTOLIC BLOOD PRESSURE: 79 MMHG | TEMPERATURE: 98.2 F | HEIGHT: 67 IN | WEIGHT: 151 LBS | SYSTOLIC BLOOD PRESSURE: 112 MMHG | BODY MASS INDEX: 23.7 KG/M2

## 2024-09-05 DIAGNOSIS — M25.551 BILATERAL HIP PAIN: Primary | ICD-10-CM

## 2024-09-05 DIAGNOSIS — M25.551 BILATERAL HIP PAIN: ICD-10-CM

## 2024-09-05 DIAGNOSIS — M54.50 ACUTE BILATERAL LOW BACK PAIN WITHOUT SCIATICA: ICD-10-CM

## 2024-09-05 DIAGNOSIS — M25.552 BILATERAL HIP PAIN: Primary | ICD-10-CM

## 2024-09-05 DIAGNOSIS — M25.552 BILATERAL HIP PAIN: ICD-10-CM

## 2024-09-05 PROCEDURE — 72100 X-RAY EXAM L-S SPINE 2/3 VWS: CPT

## 2024-09-05 PROCEDURE — 73502 X-RAY EXAM HIP UNI 2-3 VIEWS: CPT

## 2024-09-05 PROCEDURE — 99213 OFFICE O/P EST LOW 20 MIN: CPT | Performed by: NURSE PRACTITIONER

## 2024-09-05 RX ORDER — METHOCARBAMOL 500 MG/1
500 TABLET, FILM COATED ORAL 4 TIMES DAILY PRN
COMMUNITY

## 2024-09-05 NOTE — PROGRESS NOTES
"Chief Complaint  Back Pain (Lower back pain (middle) X 1 week ) and Hip Pain (Bilateral hip pain X 1 week )    Subjective          Geraldine Candelario presents to [facility]     Patient is a 58-year-old female here today for 1 week onset of low back pain and bilateral hip pain.  Denies injury.  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  Current treatment is Tylenol arthritis and methocarbamol with some improvement.  She received her first dose of Prolia approximately 5 months ago and will be due her second dose in 1 month.  She does have some concern that some of her symptoms could be side effects of Prolia.  She had diarrhea 3 months after Prolia and gastroenterology as it could have been a side effect of Prolia.  That symptom did not last long.  Not currently having diarrhea.     Objective   Vital Signs:   Vitals:    09/05/24 1529   BP: 112/79   BP Location: Right arm   Patient Position: Sitting   Cuff Size: Adult   Pulse: 94   Temp: 98.2 °F (36.8 °C)   TempSrc: Oral   SpO2: 95%   Weight: 68.5 kg (151 lb)   Height: 170.2 cm (67.01\")       Wt Readings from Last 3 Encounters:   09/05/24 68.5 kg (151 lb)   08/01/24 69 kg (152 lb 3.2 oz)   07/18/24 67.8 kg (149 lb 6.4 oz)      BP Readings from Last 3 Encounters:   09/05/24 112/79   08/01/24 113/73   07/18/24 116/74       Body mass index is 23.64 kg/m².    BMI is within normal parameters. No other follow-up for BMI required.       Physical Exam  Vitals reviewed.   Constitutional:       General: She is not in acute distress.     Appearance: Normal appearance. She is well-developed.   Cardiovascular:      Rate and Rhythm: Normal rate and regular rhythm.      Heart sounds: Normal heart sounds.   Pulmonary:      Effort: Pulmonary effort is normal.      Breath sounds: Normal breath sounds.   Musculoskeletal:        Arms:       Right lower leg: No edema.      Left lower leg: No edema.        Legs:    Skin:     General: Skin is warm and dry. "   Neurological:      General: No focal deficit present.      Mental Status: She is alert.   Psychiatric:         Attention and Perception: Attention normal.         Mood and Affect: Mood and affect normal.         Behavior: Behavior normal.           Current Outpatient Medications:     cetirizine (zyrTEC) 10 MG tablet, Take 1 tablet by mouth Daily., Disp: , Rfl:     denosumab (PROLIA) 60 MG/ML solution prefilled syringe syringe, Inject 1 mL under the skin into the appropriate area as directed Every 6 (Six) Months. Indications: Postmenopausal Osteoporosis, Disp: 1 mL, Rfl: 0    diclofenac (VOLTAREN) 50 MG EC tablet, Take 1 tablet by mouth 2 (Two) Times a Day As Needed (Right knee pain)., Disp: 60 tablet, Rfl: 2    ezetimibe (Zetia) 10 MG tablet, Take 1 tablet by mouth Daily., Disp: 30 tablet, Rfl: 2    famotidine (Pepcid) 40 MG tablet, Take 1 tablet by mouth every night at bedtime., Disp: 90 tablet, Rfl: 1    fluticasone (FLONASE) 50 MCG/ACT nasal spray, 2 sprays into the nostril(s) as directed by provider Daily., Disp: , Rfl:     hydrOXYzine (ATARAX) 25 MG tablet, TAKE 1 TABLET BY MOUTH THREE TIMES DAILY AS NEEDED FOR ITCHING (INSOMNIA), Disp: 90 tablet, Rfl: 0    methocarbamol (ROBAXIN) 500 MG tablet, Take 1 tablet by mouth 4 (Four) Times a Day As Needed for Muscle Spasms., Disp: , Rfl:     metroNIDAZOLE (METROGEL) 0.75 % gel, APPLY TOPICALLY TO FACE TWICE DAILY AS NEEDED, Disp: , Rfl:     ondansetron (Zofran) 4 MG tablet, Take 1 tablet by mouth Every 8 (Eight) Hours As Needed for Nausea or Vomiting., Disp: 30 tablet, Rfl: 0    Scopolamine 1 MG/3DAYS patch, Place 1 patch on the skin as directed by provider Every 72 (Seventy-Two) Hours., Disp: 4 patch, Rfl: 1   Past Medical History:   Diagnosis Date    Colon polyps     Esophageal dilatation 2012    Esophageal stricture 2012    GERD (gastroesophageal reflux disease)     Rectal bleeding      Allergies   Allergen Reactions    Latex Unknown - Low Severity     ALLERGY  TESTING ONLY NEVER HAS HAD A REACTION               Result Review :     Common labs          3/26/2024    10:14 4/23/2024    09:07 7/16/2024    08:25   Common Labs   Glucose 94  98  109    BUN 11  7  8    Creatinine 0.69  0.56  0.56    Sodium 142  143  142    Potassium 4.2  4.1  4.0    Chloride 103  108  104    Calcium 9.7  8.8  9.6    Albumin 4.8  4.5  4.8    Total Bilirubin 0.9  0.8  1.1    Alkaline Phosphatase 120  100  78    AST (SGOT) 123  28  25    ALT (SGPT) 290  35  23    Total Cholesterol 156  245  219    Triglycerides 98  153  82    HDL Cholesterol 51  60  67    LDL Cholesterol  87  158  138         Mammo Screening Digital Tomosynthesis Bilateral With CAD    Result Date: 9/5/2024  No mammographic evidence of malignancy.  Recommend annual screening mammography.  Given her family history of breast and ovarian cancer, consider risk stratification to determine if additional screening with breast MRI is indicated.  BI-RADS ASSESSMENT: Category 1: Negative  Note: It has been reported that there is approximately a 15% false negative rate in mammography.  Therefore, management of a palpable abnormality should not be deferred because of a negative mammogram.  Electronically Signed By-Diana Narayanan MD On:9/5/2024 12:48 PM               Social History     Tobacco Use   Smoking Status Never    Passive exposure: Past   Smokeless Tobacco Never           Assessment and Plan    Diagnoses and all orders for this visit:    1. Bilateral hip pain (Primary)  -     XR Hip With or Without Pelvis 2 - 3 View Left; Future  -     XR Hip With or Without Pelvis 2 - 3 View Right; Future    2. Acute bilateral low back pain without sciatica  -     XR Spine Lumbar 2 or 3 View; Future        Follow Up    No follow-ups on file.  Patient was given instructions and counseling regarding her condition or for health maintenance advice. Please see specific information pulled into the AVS if appropriate.

## 2024-09-09 NOTE — PROGRESS NOTES
Problem: Knowledge Deficit  Goal: Patient/family/caregiver demonstrates understanding of disease process, treatment plan, medications, and discharge instructions  Description  Complete learning assessment and assess knowledge base    Interventions:  - Provide teaching at level of understanding  - Provide teaching via preferred learning methods  Outcome: Progressing Xrays of lumbar spine and bilateral hips are normal.  Physical therapy is reasonable given low back and hip pain.  She does have concern if this could be due to prolia which was first administered almost 6 months ago.  This potential cannot be excluded.   One option is to start a trial of oral medication for osteoporosis (fosamax once per week) when next dose of prolia is due or get prolia as scheduled and get physical therapy evaluation.  Bone density test can be done again in early 2025 with either option.

## 2024-09-26 ENCOUNTER — TELEPHONE (OUTPATIENT)
Dept: FAMILY MEDICINE CLINIC | Age: 59
End: 2024-09-26
Payer: COMMERCIAL

## 2024-09-26 NOTE — TELEPHONE ENCOUNTER
Caller: Geraldine Candelario    Relationship to patient: Self    Best call back number: [phone]     Patient is needing: PATIENT WAS CALLING FOR A STATUS UPDATE ON REFERRAL FOR DEXA SCAN TO [facility].  (PLEASE SEE PATIENT MESSAGE ENCOUNTER FROM 9.24.2024)    HUB UPDATED REGISTRATION.     PLEASE CONTACT PATIENT TO ADVISE.         Is it okay if the provider responds through [employer]: YES OR CALL MAY LEAVE VOICEMAIL.

## 2024-10-01 DIAGNOSIS — M81.0 OSTEOPOROSIS WITHOUT CURRENT PATHOLOGICAL FRACTURE, UNSPECIFIED OSTEOPOROSIS TYPE: Primary | ICD-10-CM

## 2024-11-20 DIAGNOSIS — G47.00 INSOMNIA, UNSPECIFIED TYPE: ICD-10-CM

## 2024-11-20 RX ORDER — HYDROXYZINE HYDROCHLORIDE 25 MG/1
25 TABLET, FILM COATED ORAL EVERY 8 HOURS PRN
Qty: 90 TABLET | Refills: 2 | Status: SHIPPED | OUTPATIENT
Start: 2024-11-20

## 2024-12-05 ENCOUNTER — CLINICAL SUPPORT (OUTPATIENT)
Dept: GASTROENTEROLOGY | Facility: CLINIC | Age: 59
End: 2024-12-05
Payer: COMMERCIAL

## 2024-12-05 ENCOUNTER — PREP FOR SURGERY (OUTPATIENT)
Dept: OTHER | Facility: HOSPITAL | Age: 59
End: 2024-12-05
Payer: COMMERCIAL

## 2024-12-05 DIAGNOSIS — Z80.0 FAMILY HISTORY OF COLON CANCER: ICD-10-CM

## 2024-12-05 DIAGNOSIS — Z86.0100 HISTORY OF COLON POLYPS: ICD-10-CM

## 2024-12-05 DIAGNOSIS — Z12.11 COLON CANCER SCREENING: Primary | ICD-10-CM

## 2024-12-05 RX ORDER — B-COMPLEX WITH VITAMIN C
TABLET ORAL
COMMUNITY

## 2024-12-05 RX ORDER — ALENDRONATE SODIUM 70 MG/75ML
70 SOLUTION ORAL
COMMUNITY
Start: 2024-10-21

## 2024-12-05 RX ORDER — AMANTADINE HYDROCHLORIDE 100 MG/1
TABLET ORAL
COMMUNITY
Start: 2024-10-10

## 2024-12-05 RX ORDER — SODIUM, POTASSIUM,MAG SULFATES 17.5-3.13G
1 SOLUTION, RECONSTITUTED, ORAL ORAL EVERY 12 HOURS
Qty: 354 ML | Refills: 0 | Status: SHIPPED | OUTPATIENT
Start: 2024-12-05

## 2024-12-09 DIAGNOSIS — E78.2 MIXED HYPERLIPIDEMIA: ICD-10-CM

## 2024-12-11 RX ORDER — EZETIMIBE 10 MG/1
10 TABLET ORAL DAILY
Qty: 90 TABLET | Refills: 0 | Status: SHIPPED | OUTPATIENT
Start: 2024-12-11

## 2025-01-14 DIAGNOSIS — E78.2 MIXED HYPERLIPIDEMIA: Primary | ICD-10-CM

## 2025-01-14 DIAGNOSIS — Z13.1 SCREENING FOR DIABETES MELLITUS: ICD-10-CM

## 2025-01-14 DIAGNOSIS — R74.8 ELEVATED LIVER ENZYMES: ICD-10-CM

## 2025-01-16 ENCOUNTER — TELEPHONE (OUTPATIENT)
Dept: GASTROENTEROLOGY | Facility: CLINIC | Age: 60
End: 2025-01-16

## 2025-01-16 NOTE — TELEPHONE ENCOUNTER
Hub staff attempted to follow warm transfer process and was unsuccessful     Caller: Geraldine Candelario    Relationship to patient: Self    Best call back number: [phone]    Patient is needing: PT IS CALLING TO RESCHEDULE PROCEDURE THAT IS SCHEDULED FOR 01/23/2025. PLEASE GIVE PT A CALL BACK TO GET RESCHEDULED. IF NOT ABLE TO REACH PT. IT IS OKAY TO LVM.

## 2025-01-17 ENCOUNTER — LAB (OUTPATIENT)
Dept: LAB | Facility: HOSPITAL | Age: 60
End: 2025-01-17
Payer: COMMERCIAL

## 2025-01-17 DIAGNOSIS — E78.2 MIXED HYPERLIPIDEMIA: ICD-10-CM

## 2025-01-17 DIAGNOSIS — Z13.1 SCREENING FOR DIABETES MELLITUS: ICD-10-CM

## 2025-01-17 DIAGNOSIS — R74.8 ELEVATED LIVER ENZYMES: ICD-10-CM

## 2025-01-17 LAB
ALBUMIN SERPL-MCNC: 4.2 G/DL (ref 3.5–5.2)
ALBUMIN/GLOB SERPL: 1.3 G/DL
ALP SERPL-CCNC: 82 U/L (ref 39–117)
ALT SERPL W P-5'-P-CCNC: 108 U/L (ref 1–33)
ANION GAP SERPL CALCULATED.3IONS-SCNC: 10.6 MMOL/L (ref 5–15)
AST SERPL-CCNC: 62 U/L (ref 1–32)
BILIRUB SERPL-MCNC: 0.8 MG/DL (ref 0–1.2)
BUN SERPL-MCNC: 9 MG/DL (ref 6–20)
BUN/CREAT SERPL: 12.7 (ref 7–25)
CALCIUM SPEC-SCNC: 9.5 MG/DL (ref 8.6–10.5)
CHLORIDE SERPL-SCNC: 105 MMOL/L (ref 98–107)
CHOLEST SERPL-MCNC: 196 MG/DL (ref 0–200)
CO2 SERPL-SCNC: 26.4 MMOL/L (ref 22–29)
CREAT SERPL-MCNC: 0.71 MG/DL (ref 0.57–1)
EGFRCR SERPLBLD CKD-EPI 2021: 99.3 ML/MIN/1.73
GLOBULIN UR ELPH-MCNC: 3.3 GM/DL
GLUCOSE SERPL-MCNC: 97 MG/DL (ref 65–99)
HBA1C MFR BLD: 5.7 % (ref 4.8–5.6)
HDLC SERPL-MCNC: 58 MG/DL (ref 40–60)
LDLC SERPL CALC-MCNC: 119 MG/DL (ref 0–100)
LDLC/HDLC SERPL: 2.02 {RATIO}
POTASSIUM SERPL-SCNC: 4 MMOL/L (ref 3.5–5.2)
PROT SERPL-MCNC: 7.5 G/DL (ref 6–8.5)
SODIUM SERPL-SCNC: 142 MMOL/L (ref 136–145)
TRIGL SERPL-MCNC: 105 MG/DL (ref 0–150)
VLDLC SERPL-MCNC: 19 MG/DL (ref 5–40)

## 2025-01-17 PROCEDURE — 80053 COMPREHEN METABOLIC PANEL: CPT

## 2025-01-17 PROCEDURE — 83036 HEMOGLOBIN GLYCOSYLATED A1C: CPT

## 2025-01-17 PROCEDURE — 36415 COLL VENOUS BLD VENIPUNCTURE: CPT

## 2025-01-17 PROCEDURE — 80061 LIPID PANEL: CPT

## 2025-01-19 NOTE — PROGRESS NOTES
Blood sugar improved.  Kidney function normal.  Liver enzymes have increased.  Cholesterol improved.  Are you currently having abdominal pain, nausea, or vomiting?  Chart indicates you take zetia only for cholesterol.  How often are you needing to take antiinflammatory medication?

## 2025-01-20 ENCOUNTER — TELEPHONE (OUTPATIENT)
Dept: GASTROENTEROLOGY | Facility: CLINIC | Age: 60
End: 2025-01-20
Payer: COMMERCIAL

## 2025-01-20 NOTE — TELEPHONE ENCOUNTER
VOICEMAIL HAS BEEN LEFT FOR PT TO CONTACT THE OFFICE TO RESCHEDULE PROCEDURE  CERTIFIED LETTER MAILED

## 2025-01-20 NOTE — TELEPHONE ENCOUNTER
Received email from [facility].    Geraldine Candelario [phone]- patient cancelled with PAT- no reason given.      Mailed certified letter to curtis brown

## 2025-02-04 ENCOUNTER — OFFICE VISIT (OUTPATIENT)
Dept: FAMILY MEDICINE CLINIC | Age: 60
End: 2025-02-04
Payer: COMMERCIAL

## 2025-02-04 ENCOUNTER — LAB (OUTPATIENT)
Dept: LAB | Facility: HOSPITAL | Age: 60
End: 2025-02-04
Payer: COMMERCIAL

## 2025-02-04 VITALS
TEMPERATURE: 97.7 F | SYSTOLIC BLOOD PRESSURE: 114 MMHG | HEART RATE: 74 BPM | BODY MASS INDEX: 22.63 KG/M2 | HEIGHT: 67 IN | DIASTOLIC BLOOD PRESSURE: 66 MMHG | WEIGHT: 144.2 LBS | OXYGEN SATURATION: 99 %

## 2025-02-04 DIAGNOSIS — R74.8 ELEVATED LIVER ENZYMES: Primary | ICD-10-CM

## 2025-02-04 DIAGNOSIS — M25.50 ARTHRALGIA, UNSPECIFIED JOINT: ICD-10-CM

## 2025-02-04 DIAGNOSIS — R74.8 ELEVATED LIVER ENZYMES: ICD-10-CM

## 2025-02-04 DIAGNOSIS — M25.552 BILATERAL HIP PAIN: ICD-10-CM

## 2025-02-04 DIAGNOSIS — G89.29 CHRONIC BILATERAL LOW BACK PAIN WITHOUT SCIATICA: ICD-10-CM

## 2025-02-04 DIAGNOSIS — G47.00 INSOMNIA, UNSPECIFIED TYPE: ICD-10-CM

## 2025-02-04 DIAGNOSIS — M25.551 BILATERAL HIP PAIN: ICD-10-CM

## 2025-02-04 DIAGNOSIS — M54.50 CHRONIC BILATERAL LOW BACK PAIN WITHOUT SCIATICA: ICD-10-CM

## 2025-02-04 DIAGNOSIS — E78.2 MIXED HYPERLIPIDEMIA: ICD-10-CM

## 2025-02-04 DIAGNOSIS — M81.0 OSTEOPOROSIS WITHOUT CURRENT PATHOLOGICAL FRACTURE, UNSPECIFIED OSTEOPOROSIS TYPE: ICD-10-CM

## 2025-02-04 LAB
ALBUMIN SERPL-MCNC: 4.5 G/DL (ref 3.5–5.2)
ALP SERPL-CCNC: 88 U/L (ref 39–117)
ALT SERPL W P-5'-P-CCNC: 51 U/L (ref 1–33)
AST SERPL-CCNC: 31 U/L (ref 1–32)
BILIRUB CONJ SERPL-MCNC: <0.2 MG/DL (ref 0–0.3)
BILIRUB INDIRECT SERPL-MCNC: ABNORMAL MG/DL
BILIRUB SERPL-MCNC: 0.6 MG/DL (ref 0–1.2)
CHROMATIN AB SERPL-ACNC: 11 IU/ML (ref 0–14)
CK SERPL-CCNC: 54 U/L (ref 20–180)
HAV IGM SERPL QL IA: NORMAL
HBV CORE IGM SERPL QL IA: NORMAL
HBV SURFACE AG SERPL QL IA: NORMAL
HCV AB SER QL: NORMAL
PROT SERPL-MCNC: 7.1 G/DL (ref 6–8.5)

## 2025-02-04 PROCEDURE — 86038 ANTINUCLEAR ANTIBODIES: CPT

## 2025-02-04 PROCEDURE — 82550 ASSAY OF CK (CPK): CPT

## 2025-02-04 PROCEDURE — 80074 ACUTE HEPATITIS PANEL: CPT

## 2025-02-04 PROCEDURE — 36415 COLL VENOUS BLD VENIPUNCTURE: CPT

## 2025-02-04 PROCEDURE — 86431 RHEUMATOID FACTOR QUANT: CPT

## 2025-02-04 PROCEDURE — 80076 HEPATIC FUNCTION PANEL: CPT

## 2025-02-04 PROCEDURE — 99214 OFFICE O/P EST MOD 30 MIN: CPT | Performed by: NURSE PRACTITIONER

## 2025-02-04 PROCEDURE — 86618 LYME DISEASE ANTIBODY: CPT

## 2025-02-04 RX ORDER — HYDROXYZINE HYDROCHLORIDE 25 MG/1
25 TABLET, FILM COATED ORAL EVERY 8 HOURS PRN
Qty: 90 TABLET | Refills: 2 | Status: SHIPPED | OUTPATIENT
Start: 2025-02-04

## 2025-02-04 RX ORDER — METHOCARBAMOL 500 MG/1
500 TABLET, FILM COATED ORAL 3 TIMES DAILY PRN
Qty: 30 TABLET | Refills: 2 | Status: SHIPPED | OUTPATIENT
Start: 2025-02-04

## 2025-02-04 RX ORDER — CHOLECALCIFEROL (VITAMIN D3) 25 MCG
1000 TABLET ORAL DAILY
COMMUNITY

## 2025-02-04 RX ORDER — EZETIMIBE 10 MG/1
10 TABLET ORAL DAILY
Qty: 90 TABLET | Refills: 1 | Status: SHIPPED | OUTPATIENT
Start: 2025-02-04

## 2025-02-04 NOTE — PROGRESS NOTES
"Chief Complaint  Hyperlipidemia (6 mo f/u ) and Osteoporosis (6 mo f/u )    Subjective          Geraldine Candelario presents to [facility]     Patient is a 59-year-old female who is here today to follow-up regarding osteoporosis and arthralgia.  Is seeing orthopedics.  Symptoms have not improved since discontinuing Prolia.  On liquid Fosamax without intolerance.  Does have a history of tick bites though no specific tick bite of concern has been noted.  X-rays have been done and rheumatoid and inflammatory workup 1 year ago was negative.  Severity of symptoms have not increased since symptoms started 2 years ago but location of symptoms are more diffuse.  She denies any muscle pain on exam today.  Also with some generalized muscle weakness and has attended physical therapy with some benefit.  Orthopedics is sending her to physical therapy for pelvic floor strengthening.  Patient discontinue diclofenac due to elevated liver enzymes on January lab work.  Patient states eating an anti-inflammatory diet helps minimally.  She does take Tylenol intermittently.    For hyperlipidemia she is taking Zetia 10 mg daily.  Denies side effects and requests refills.    For insomnia she takes hydroxyzine 25 mg at bedtime as needed.  Denies side effects and requests refills.     Objective   Vital Signs:   Vitals:    02/04/25 1250   BP: 114/66   BP Location: Right arm   Patient Position: Sitting   Cuff Size: Adult   Pulse: 74   Temp: 97.7 °F (36.5 °C)   TempSrc: Oral   SpO2: 99%   Weight: 65.4 kg (144 lb 3.2 oz)   Height: 170.2 cm (67.01\")       Wt Readings from Last 3 Encounters:   02/04/25 65.4 kg (144 lb 3.2 oz)   09/05/24 68.5 kg (151 lb)   08/01/24 69 kg (152 lb 3.2 oz)      BP Readings from Last 3 Encounters:   02/04/25 114/66   09/05/24 112/79   08/01/24 113/73       Body mass index is 22.58 kg/m².    BMI is within normal parameters. No other follow-up for BMI required.       Physical Exam  Vitals " reviewed.   Constitutional:       General: She is not in acute distress.     Appearance: Normal appearance. She is well-developed.   Cardiovascular:      Rate and Rhythm: Normal rate and regular rhythm.      Heart sounds: Normal heart sounds.   Pulmonary:      Effort: Pulmonary effort is normal.      Breath sounds: Normal breath sounds.   Musculoskeletal:      Right lower leg: No edema.      Left lower leg: No edema.   Skin:     General: Skin is warm and dry.   Neurological:      General: No focal deficit present.      Mental Status: She is alert.   Psychiatric:         Attention and Perception: Attention normal.         Mood and Affect: Mood and affect normal.         Behavior: Behavior normal.           Current Outpatient Medications:     alendronate (FOSAMAX) 70 MG/75ML solution, Take 75 mL by mouth Every 7 (Seven) Days., Disp: , Rfl:     Calcium Carbonate-Vitamin D 600-5 MG-MCG tablet, Take  by mouth., Disp: , Rfl:     cetirizine (zyrTEC) 10 MG tablet, Take 1 tablet by mouth Daily., Disp: , Rfl:     cholecalciferol (Vitamin D, Cholecalciferol,) 25 MCG (1000 UT) tablet, Take 1 tablet by mouth Daily., Disp: , Rfl:     ezetimibe (ZETIA) 10 MG tablet, Take 1 tablet by mouth Daily., Disp: 90 tablet, Rfl: 1    fluticasone (FLONASE) 50 MCG/ACT nasal spray, Administer 2 sprays into the nostril(s) as directed by provider Daily., Disp: , Rfl:     hydrOXYzine (ATARAX) 25 MG tablet, Take 1 tablet by mouth Every 8 (Eight) Hours As Needed for Itching, Allergies or Anxiety (insomnia). May cause drowsiness, Disp: 90 tablet, Rfl: 2    methocarbamol (ROBAXIN) 500 MG tablet, Take 1 tablet by mouth 3 (Three) Times a Day As Needed for Muscle Spasms., Disp: 30 tablet, Rfl: 2    metroNIDAZOLE (METROGEL) 0.75 % gel, APPLY TOPICALLY TO FACE TWICE DAILY AS NEEDED, Disp: , Rfl:     ondansetron (Zofran) 4 MG tablet, Take 1 tablet by mouth Every 8 (Eight) Hours As Needed for Nausea or Vomiting., Disp: 30 tablet, Rfl: 0    Scopolamine 1  MG/3DAYS patch, Place 1 patch on the skin as directed by provider Every 72 (Seventy-Two) Hours., Disp: 4 patch, Rfl: 1    sodium-potassium-magnesium sulfates (Suprep Bowel Prep Kit) 17.5-3.13-1.6 GM/177ML solution oral solution, Take 1 bottle by mouth Every 12 (Twelve) Hours. (Patient not taking: Reported on 2/4/2025), Disp: 354 mL, Rfl: 0   Past Medical History:   Diagnosis Date    Allergic     Arthritis     Colon polyps     Esophageal dilatation 2012    Esophageal stricture 2012    GERD (gastroesophageal reflux disease)     Hyperlipidemia 2023    Osteopenia     Rectal bleeding      Allergies   Allergen Reactions    Latex Unknown - Low Severity     ALLERGY TESTING ONLY NEVER HAS HAD A REACTION               Result Review :     Common labs          7/16/2024    08:25 1/17/2025    09:36 2/4/2025    13:44   Common Labs   Glucose 109  97     BUN 8  9     Creatinine 0.56  0.71     Sodium 142  142     Potassium 4.0  4.0     Chloride 104  105     Calcium 9.6  9.5     Albumin 4.8  4.2  4.5    Total Bilirubin 1.1  0.8  0.6    Alkaline Phosphatase 78  82  88    AST (SGOT) 25  62  31    ALT (SGPT) 23  108  51    Total Cholesterol 219  196     Triglycerides 82  105     HDL Cholesterol 67  58     LDL Cholesterol  138  119     Hemoglobin A1C  5.70          No Images in the past 120 days found..           Social History     Tobacco Use   Smoking Status Never    Passive exposure: Past   Smokeless Tobacco Never           Assessment and Plan    Diagnoses and all orders for this visit:    1. Elevated liver enzymes (Primary)  -     Hepatic Function Panel; Future  -     Hepatitis Panel, Acute; Future    2. Arthralgia, unspecified joint  Assessment & Plan:  Further treatment recommendations pending lab results.  Agree with physical therapy as ordered by orthopedics.    Orders:  -     Lyme Disease Total Antibody With Reflex to Immunoassay; Future  -     SUGAR by IFA, Reflex 9-biomarkers profile; Future  -     RHEUMATOID FACTOR; Future  -      CK; Future    3. Insomnia, unspecified type  -     hydrOXYzine (ATARAX) 25 MG tablet; Take 1 tablet by mouth Every 8 (Eight) Hours As Needed for Itching, Allergies or Anxiety (insomnia). May cause drowsiness  Dispense: 90 tablet; Refill: 2    4. Mixed hyperlipidemia  -     ezetimibe (ZETIA) 10 MG tablet; Take 1 tablet by mouth Daily.  Dispense: 90 tablet; Refill: 1    5. Osteoporosis without current pathological fracture, unspecified osteoporosis type  -     DEXA Bone Density Axial; Future    6. Bilateral hip pain  Assessment & Plan:  Continue with orthopedics.      7. Chronic bilateral low back pain without sciatica  -     methocarbamol (ROBAXIN) 500 MG tablet; Take 1 tablet by mouth 3 (Three) Times a Day As Needed for Muscle Spasms.  Dispense: 30 tablet; Refill: 2        Follow Up    Return in about 6 months (around 8/4/2025).  Patient was given instructions and counseling regarding her condition or for health maintenance advice. Please see specific information pulled into the AVS if appropriate.

## 2025-02-06 LAB
ANA SER QL IF: NEGATIVE
B BURGDOR IGG+IGM SER QL IA: NEGATIVE
LABORATORY COMMENT REPORT: NORMAL

## 2025-02-07 NOTE — ASSESSMENT & PLAN NOTE
Further treatment recommendations pending lab results.  Agree with physical therapy as ordered by orthopedics.

## 2025-02-07 NOTE — PROGRESS NOTES
Liver function has notably improved since discontinuing the nonsteroid anti-inflammatory medication.  You are negative for hepatitis or autoimmune disease process.  No sign of muscle breakdown.  You are negative for Lyme disease , rheumatoid arthritis and hepatitis.  If not improving with physical therapy and follow-up with orthopedics, consider an MRI of the lumbar spine without contrast to rule out a disc issue.

## 2025-02-17 DIAGNOSIS — Z87.898 H/O MOTION SICKNESS: ICD-10-CM

## 2025-02-17 RX ORDER — SCOPOLAMINE 1 MG/3D
1 PATCH, EXTENDED RELEASE TRANSDERMAL
Qty: 4 PATCH | Refills: 1 | Status: SHIPPED | OUTPATIENT
Start: 2025-02-17

## 2025-06-16 DIAGNOSIS — Z87.898 H/O MOTION SICKNESS: ICD-10-CM

## 2025-06-16 RX ORDER — SCOPOLAMINE 1 MG/3D
PATCH, EXTENDED RELEASE TRANSDERMAL
Qty: 4 PATCH | Refills: 0 | Status: SHIPPED | OUTPATIENT
Start: 2025-06-16

## (undated) DEVICE — THE SINGLE USE ETRAP – POLYP TRAP IS USED FOR SUCTION RETRIEVAL OF ENDOSCOPICALLY REMOVED POLYPS.: Brand: ETRAP

## (undated) DEVICE — KT SYR GEL ORISE SNGL PK 10ML

## (undated) DEVICE — EGD OR ERCP KIT: Brand: MEDLINE INDUSTRIES, INC.

## (undated) DEVICE — COLON KIT: Brand: MEDLINE INDUSTRIES, INC.

## (undated) DEVICE — SNAR POLYP CAPTIVATOR CRSNT 27MM 240CM

## (undated) DEVICE — Device: Brand: DEFENDO AIR/WATER/SUCTION AND BIOPSY VALVE

## (undated) DEVICE — SINGLE-USE BIOPSY FORCEPS: Brand: RADIAL JAW 4

## (undated) DEVICE — SNAR E/S POLYP SNAREMASTER OVL/10MM 2.8X2300MM YEL

## (undated) DEVICE — Device: Brand: SINGLE USE INJECTOR NM600/610

## (undated) DEVICE — SOL IRRG H2O PL/BG 1000ML STRL